# Patient Record
Sex: FEMALE | Race: WHITE | Employment: OTHER | URBAN - NONMETROPOLITAN AREA
[De-identification: names, ages, dates, MRNs, and addresses within clinical notes are randomized per-mention and may not be internally consistent; named-entity substitution may affect disease eponyms.]

---

## 2020-09-14 ENCOUNTER — HOSPITAL ENCOUNTER (OUTPATIENT)
Dept: MAMMOGRAPHY | Age: 65
Discharge: HOME | End: 2020-09-14
Attending: OBSTETRICS & GYNECOLOGY
Payer: MEDICARE

## 2020-09-14 DIAGNOSIS — Z12.31 VISIT FOR SCREENING MAMMOGRAM: ICD-10-CM

## 2020-09-14 PROCEDURE — 77067 SCR MAMMO BI INCL CAD: CPT

## 2021-08-27 ENCOUNTER — TRANSCRIBE ORDER (OUTPATIENT)
Dept: SCHEDULING | Age: 66
End: 2021-08-27

## 2021-08-27 DIAGNOSIS — Z12.31 VISIT FOR SCREENING MAMMOGRAM: Primary | ICD-10-CM

## 2021-09-15 ENCOUNTER — HOSPITAL ENCOUNTER (OUTPATIENT)
Dept: MAMMOGRAPHY | Age: 66
Discharge: HOME | End: 2021-09-15
Attending: OBSTETRICS & GYNECOLOGY
Payer: MEDICARE

## 2021-09-15 DIAGNOSIS — Z12.31 VISIT FOR SCREENING MAMMOGRAM: ICD-10-CM

## 2021-09-15 PROCEDURE — 77067 SCR MAMMO BI INCL CAD: CPT

## 2021-09-23 ENCOUNTER — TRANSCRIBE ORDER (OUTPATIENT)
Dept: SCHEDULING | Age: 66
End: 2021-09-23

## 2021-09-23 DIAGNOSIS — R00.2 PALPITATIONS: ICD-10-CM

## 2021-09-23 DIAGNOSIS — I34.1 MVP (MITRAL VALVE PROLAPSE): ICD-10-CM

## 2021-09-23 DIAGNOSIS — R00.2 PALPITATION: Primary | ICD-10-CM

## 2021-10-20 ENCOUNTER — HOSPITAL ENCOUNTER (OUTPATIENT)
Dept: NON INVASIVE DIAGNOSTICS | Age: 66
Discharge: HOME | End: 2021-10-20
Attending: INTERNAL MEDICINE
Payer: MEDICARE

## 2021-10-20 VITALS
HEIGHT: 65 IN | SYSTOLIC BLOOD PRESSURE: 138 MMHG | DIASTOLIC BLOOD PRESSURE: 62 MMHG | BODY MASS INDEX: 23.66 KG/M2 | WEIGHT: 142 LBS

## 2021-10-20 DIAGNOSIS — R00.2 PALPITATIONS: ICD-10-CM

## 2021-10-20 DIAGNOSIS — I34.1 MVP (MITRAL VALVE PROLAPSE): ICD-10-CM

## 2021-10-20 LAB
ECHO AO ASC DIAM: 3.2 CM
ECHO AO ROOT DIAM: 2.5 CM
ECHO AV AREA PEAK VELOCITY: 2.94 CM2
ECHO AV AREA PLAN/BSA: 1.63
ECHO AV AREA VTI: 2.79 CM2
ECHO AV AREA/BSA PEAK VELOCITY: 1.7 CM2/M2
ECHO AV AREA/BSA VTI: 1.6 CM2/M2
ECHO AV CUSP MM: 1.6 CM
ECHO AV MEAN GRADIENT: 3 MMHG
ECHO AV MEAN VELOCITY: 87.1 CM/S
ECHO AV PEAK GRADIENT: 6 MMHG
ECHO AV PEAK VELOCITY: 123 CM/S
ECHO AV VTI: 29.3 CM
ECHO EST RA PRESSURE: 3 MMHG
ECHO LA AREA 2C: 17.6 CM2
ECHO LA AREA 4C: 17.8 CM2
ECHO LA MAJOR AXIS: 3.2 CM
ECHO LA MAJOR AXIS: 5.02 CM
ECHO LA TO AORTIC ROOT RATIO: 1.28
ECHO LV E' LATERAL VELOCITY: 9.08 CM/S
ECHO LV E' SEPTAL VELOCITY: 5.87 CM/S
ECHO LV EDV A2C: 104 CM3
ECHO LV EJECTION FRACTION BIPLANE: 62.8 % (ref 55–100)
ECHO LV ESV A2C: 29.8 CM3
ECHO LV INTERNAL DIMENSION DIASTOLIC: 4.7 CM (ref 3.9–5.3)
ECHO LV INTERNAL DIMENSION SYSTOLIC: 3.1 CM
ECHO LV IVSD: 1 CM (ref 0.6–0.9)
ECHO LV MASS 2D: 164.5 G (ref 67–162)
ECHO LV MASS INDEX 2D: 96.2 G/M2 (ref 43–95)
ECHO LV POSTERIOR WALL DIASTOLIC: 1 CM (ref 0.6–0.9)
ECHO LVOT DIAM: 2 CM
ECHO LVOT PEAK GRADIENT: 5 MMHG
ECHO LVOT PEAK VELOCITY: 115 CM/S
ECHO LVOT SV: 82 CM3
ECHO LVOT VTI: 26 CM
ECHO LVOT VTI: 26 CM
ECHO MV A VELOCITY: 81.5 CM/S
ECHO MV AREA PHT: 3.33 CM2
ECHO MV E DECELERATION TIME (DT): 225 MS
ECHO MV E VELOCITY: 74.2 CM/S
ECHO MV E/A RATIO: 0.91
ECHO MV E/E' LATERAL: 8.17
ECHO MV E/E' RATIO (AVERAGED): 10.41
ECHO MV E/E' SEPTAL: 12.64
ECHO MV MAX VELOCITY: 79.6 CM/S
ECHO MV MEAN GRADIENT: 1 MMHG
ECHO MV PEAK GRADIENT: 3 MMHG
ECHO MV PRESSURE HALF TIME (PHT): 66 MS
ECHO MV VTI: 28.5 CM
ECHO RA AREA 4C: 12.7 CM2
ECHO RA MAJOR AXIS: 4.28 CM
ECHO RA MINOR AXIS: 3.4 CM
ECHO RIGHT VENTRICULAR SYSTOLIC PRESSURE (RVSP): 26 MMHG
ECHO RV INTERNAL DIMENSION: 2.4 CM
ECHO RV TAPSE: 1.75 CM (ref 1.5–2)
ECHO TV MEAN GRADIENT: 23 MMHG
ECHO TV REGURGITANT MAX VELOCITY: 239 CM/S
LA VOL DISK BP: 51.6 CM3 (ref 22–52)
LVOT MG: 3 MMHG
MV DEC SLOPE: 3290 MM/S2
MV DEC SLOPE: 3290 MM/S2

## 2021-10-20 PROCEDURE — 93226 XTRNL ECG REC<48 HR SCAN A/R: CPT

## 2021-10-20 PROCEDURE — 93306 TTE W/DOPPLER COMPLETE: CPT

## 2022-09-01 ENCOUNTER — TRANSCRIBE ORDER (OUTPATIENT)
Dept: SCHEDULING | Age: 67
End: 2022-09-01

## 2022-09-01 DIAGNOSIS — Z12.31 VISIT FOR SCREENING MAMMOGRAM: Primary | ICD-10-CM

## 2022-09-19 ENCOUNTER — HOSPITAL ENCOUNTER (OUTPATIENT)
Dept: MAMMOGRAPHY | Age: 67
Discharge: HOME | End: 2022-09-19
Attending: INTERNAL MEDICINE
Payer: MEDICARE

## 2022-09-19 DIAGNOSIS — Z12.31 VISIT FOR SCREENING MAMMOGRAM: ICD-10-CM

## 2022-09-19 PROCEDURE — 77063 BREAST TOMOSYNTHESIS BI: CPT

## 2022-11-10 ENCOUNTER — TRANSCRIBE ORDER (OUTPATIENT)
Dept: SCHEDULING | Age: 67
End: 2022-11-10

## 2022-11-10 DIAGNOSIS — M81.0 POST-MENOPAUSAL OSTEOPOROSIS: Primary | ICD-10-CM

## 2022-11-17 ENCOUNTER — PREP FOR PROCEDURE (OUTPATIENT)
Dept: GASTROENTEROLOGY | Age: 67
End: 2022-11-17

## 2022-11-17 ENCOUNTER — OFFICE VISIT (OUTPATIENT)
Dept: GASTROENTEROLOGY | Age: 67
End: 2022-11-17
Payer: MEDICARE

## 2022-11-17 ENCOUNTER — TRANSCRIBE ORDER (OUTPATIENT)
Dept: SCHEDULING | Age: 67
End: 2022-11-17

## 2022-11-17 VITALS
DIASTOLIC BLOOD PRESSURE: 57 MMHG | SYSTOLIC BLOOD PRESSURE: 123 MMHG | WEIGHT: 145 LBS | HEART RATE: 67 BPM | BODY MASS INDEX: 24.13 KG/M2

## 2022-11-17 DIAGNOSIS — R09.89 CAROTID BRUIT: ICD-10-CM

## 2022-11-17 DIAGNOSIS — E78.2 MIXED HYPERLIPIDEMIA: ICD-10-CM

## 2022-11-17 DIAGNOSIS — K21.9 GASTROESOPHAGEAL REFLUX DISEASE WITHOUT ESOPHAGITIS: ICD-10-CM

## 2022-11-17 DIAGNOSIS — R19.7 DIARRHEA, UNSPECIFIED TYPE: Primary | ICD-10-CM

## 2022-11-17 DIAGNOSIS — E03.9 ACQUIRED HYPOTHYROIDISM: ICD-10-CM

## 2022-11-17 DIAGNOSIS — Z12.11 COLON CANCER SCREENING: ICD-10-CM

## 2022-11-17 DIAGNOSIS — I34.1 MITRAL VALVE PROLAPSE: Primary | ICD-10-CM

## 2022-11-17 PROCEDURE — 1090F PRES/ABSN URINE INCON ASSESS: CPT | Performed by: INTERNAL MEDICINE

## 2022-11-17 PROCEDURE — 3017F COLORECTAL CA SCREEN DOC REV: CPT | Performed by: INTERNAL MEDICINE

## 2022-11-17 PROCEDURE — 99203 OFFICE O/P NEW LOW 30 MIN: CPT | Performed by: INTERNAL MEDICINE

## 2022-11-17 PROCEDURE — 1123F ACP DISCUSS/DSCN MKR DOCD: CPT | Performed by: INTERNAL MEDICINE

## 2022-11-17 PROCEDURE — G8400 PT W/DXA NO RESULTS DOC: HCPCS | Performed by: INTERNAL MEDICINE

## 2022-11-17 PROCEDURE — G8536 NO DOC ELDER MAL SCRN: HCPCS | Performed by: INTERNAL MEDICINE

## 2022-11-17 PROCEDURE — G9899 SCRN MAM PERF RSLTS DOC: HCPCS | Performed by: INTERNAL MEDICINE

## 2022-11-17 PROCEDURE — G8510 SCR DEP NEG, NO PLAN REQD: HCPCS | Performed by: INTERNAL MEDICINE

## 2022-11-17 PROCEDURE — 1101F PT FALLS ASSESS-DOCD LE1/YR: CPT | Performed by: INTERNAL MEDICINE

## 2022-11-17 PROCEDURE — G8427 DOCREV CUR MEDS BY ELIG CLIN: HCPCS | Performed by: INTERNAL MEDICINE

## 2022-11-17 PROCEDURE — G8420 CALC BMI NORM PARAMETERS: HCPCS | Performed by: INTERNAL MEDICINE

## 2022-11-17 RX ORDER — OMEPRAZOLE 40 MG/1
CAPSULE, DELAYED RELEASE ORAL
COMMUNITY
Start: 2022-10-06

## 2022-11-17 RX ORDER — LEVOTHYROXINE SODIUM 50 UG/1
TABLET ORAL
COMMUNITY
Start: 2022-11-03

## 2022-11-17 RX ORDER — ALENDRONATE SODIUM 70 MG/1
TABLET ORAL
COMMUNITY
Start: 2022-08-15

## 2022-11-17 RX ORDER — SODIUM CHLORIDE, SODIUM LACTATE, POTASSIUM CHLORIDE, CALCIUM CHLORIDE 600; 310; 30; 20 MG/100ML; MG/100ML; MG/100ML; MG/100ML
75 INJECTION, SOLUTION INTRAVENOUS CONTINUOUS
OUTPATIENT
Start: 2022-11-17 | End: 2022-11-17

## 2022-11-17 RX ORDER — POLYETHYLENE GLYCOL 3350, SODIUM SULFATE ANHYDROUS, SODIUM BICARBONATE, SODIUM CHLORIDE, POTASSIUM CHLORIDE 236; 22.74; 6.74; 5.86; 2.97 G/4L; G/4L; G/4L; G/4L; G/4L
4 POWDER, FOR SOLUTION ORAL
Qty: 4000 ML | Refills: 0 | Status: SHIPPED | OUTPATIENT
Start: 2022-11-17 | End: 2022-11-17

## 2022-11-17 RX ORDER — SIMVASTATIN 20 MG/1
TABLET, FILM COATED ORAL
COMMUNITY
Start: 2022-10-05

## 2022-11-17 RX ORDER — ERGOCALCIFEROL 1.25 MG/1
CAPSULE ORAL
COMMUNITY
Start: 2022-10-03

## 2022-11-17 NOTE — PROGRESS NOTES
Referring Physician:  Navjot Johnson MD     Chief Complaint: Diarrhea, colon cancer screening    Date of service: 11/17/22     Subjective:     History of Present Illness:  Patient is a female [address] [facility] 79 y.o.  who is seen for evaluation for diarrhea. The history is from the patient and their medical records. The patient has GI complaints of diarrhea for 2 years. Patient describes their bowel movements as follows: watery, with 1-2 BM a day. The watery BM are about once a week. Associated symptoms include none. The patient has tried the following to treat their diarrhea: fiber pills- only took for a week then stopped for unclear reasons. Has never used imodium AD. The patient denies nausea, vomiting, fever, chills, abdominal pain, dysphagia, change in bowel habits, constipation, weight loss, rectal bleeding, or melena. She has occasional reflux that responds to daily Omeprazole. Her reflux syptoms are mainly burping, and not regurgitation or heartburn. Admits to drinking lots of soda daily. Last EGD- none. Last colonoscopy-  last one 10 years ago- no polyps. Family history for GI disease is significant for no GI or liver disease. The patient denies liver related risk factor. Tobacco use- none. Alcohol use- none. Polysubstance use -none. Past medical history is significant for  hypothyroidism, GERD, hyperlipidemia. PMH:  Past Medical History:   Diagnosis Date    Hypercholesterolemia     Menopause         PSH:  History reviewed. No pertinent surgical history. Allergies:  No Known Allergies     Home Medications:  Cannot display prior to admission medications because the patient has not been admitted in this contact.         Hospital Medications:  Current Outpatient Medications   Medication Sig    alendronate (FOSAMAX) 70 mg tablet TAKE 1 TABLET BY MOUTH ONCE A WEEK IN THE MORNING AT LEAST 30 MINUTES BEFORE FIRST FOOD, BEVERAGE OR MEDICATION OF THE DAY    simvastatin (ZOCOR) 20 mg tablet TAKE 1 TABLET BY MOUTH ONCE DAILY FOR CHOLESTEROL    omeprazole (PRILOSEC) 40 mg capsule TAKE 1 CAPSULE BY MOUTH ONCE DAILY IN THE MORNING 15 MINUTES PRIOR TO BREAKFAST    levothyroxine (SYNTHROID) 50 mcg tablet TAKE 1 TABLET BY MOUTH ONCE DAILY FOR THYROID    ergocalciferol (ERGOCALCIFEROL) 1,250 mcg (50,000 unit) capsule TAKE 1 CAPSULE BY MOUTH ONCE EVERY MONTH     No current facility-administered medications for this visit. Social History:  Social History     Tobacco Use    Smoking status: Never    Smokeless tobacco: Never   Substance Use Topics    Alcohol use: Not on file        Pt denies any history of IV drug use, blood transfusions. Family History:  History reviewed. No pertinent family history. Review of Systems:  A detailed 10 system ROS is obtained, with pertinent positives as listed above. All others are negative unless listed in history above. Constitutional denies fever chills, headache, or weight loss. Skin- denies lesions or rashes. HEENT- denies any vision or hearing problems, epistaxis, sore throat, or dental problems. Lungs- no shortness of breath or chest pain reported, no dyspnea on exertion. Cardiac- no palpitations or chest pain reported, including at rest or on exertion. GI-no abdominal pain, melena, rectal bleeding, reflux, dysphagia, jaundice, change in stool or urine color, constipation or diarrhea. Genitourinary -no dysuria or hematuria. Musculoskeletal-no muscle weakness or disuse or atrophy. Neurologic-no numbness, tingling, gait disturbance, or other abnormalities. Rheumatologic- patient denies any immune or rheumatologic diseases or symptoms. Endocrine- patient denies any endocrine abnormalities including thyroid disease or diabetes. Psychologic-patient denies depression, anxiety or emotional issues. No reported memory issues.         Objective:     Physical Exam:  Vitals: BP (!) 123/57 (BP 1 Location: Left upper arm, BP Patient Position: Sitting)   Pulse 67   Wt 65.8 kg (145 lb)   BMI 24.13 kg/m²    Gen:  Pt is alert, cooperative, no acute distress  Skin:  Extremities and face reveal no rashes. No fountain erythema. No telangiectasias on the chest wall. HEENT: Sclerae anicteric. Extra-ocular muscles are intact. No oral ulcers. No abnormal pigmentation of the lips. The neck is supple. Cardiovascular: Regular rate and rhythm. No murmurs, gallops, or rubs. Respiratory:  Comfortable breathing with no accessory muscle use. Clear breath sounds anteriorly with no wheezes, rales, or rhonchi. GI:  Abdomen nondistended, soft, and nontender. Normal active bowel sounds. No enlargement of the liver or spleen. No masses palpable. Rectal:  Deferred  Musculoskeletal:   No costovertebral tenderness. No localized muscle weakness, or decreased range of motion. Extremities:  No palpable cords or pitting edema of the lower legs. Extremities have good range of motion. Neurological:  Gross memory appears intact. Patient is alert and oriented. Psychiatric:  Mood appears appropriate with judgement intact. Lymphatic:  No cervical or supraclavicular adenopathy. Laboratory:   No results        CT scan of abdomen and pelvis - No results  CT Results (most recent):  No results found for this or any previous visit. Abdominal US- No results  US Results (most recent):  No results found for this or any previous visit. MRI and MRCP- No results  MRI Results (most recent):  No results found for this or any previous visit. Assessment:     Diarrhea. This is a minimal problem, occurring 1x a week. Watery stool mainly, and I feel she would benefit from a fiber supplement. Esophageal reflux. Not sure if she has reflux or aerophagia with burping being the main complaint and only mild improvement with omeprazole. She drinks a lot of soda and the carbonation may be causing her burping. Colon cancer screening.   She is due for screening. Hypothyroidism. She is on oral thyroid replacement. Plan:     Colonoscopy with MAC. Bowel prep- PEG prep. I discussed the techniques involved with the procedure as well as the risks, benefits, and alternatives including but not limited to bleeding, infection, perforation requiring emergent surgery, missing lesions, death, and anesthesia related complications with the patient. All questions and concerns were answered. The patient voiced understanding and agrees to proceed. I recommend the patient start a high-fiber supplement such as Benefiber or generic equivalent. They are to take 1 tablespoon 1-2 times a day with adequate fluid intake. Also can be placed on food they are eating. They can also consider instead of Benefiber some other type of fiber if they choose, including Metamucil, Citrucel, etc.  OK to use the omeprazole on an as needed basis as I suspect her burping is due to aerophagia and not reflux. Further recommendations pending the patient's clinical course, if needed. The patient will follow up with me as needed.       Alex Hanna MD

## 2022-11-17 NOTE — LETTER
11/17/2022    Patient: Sharif Lyons   YOB: 1955   Date of Visit: 11/17/2022     Dayna De Luna MD  [facility] [address] [address]  Via Fax: [phone]     Sharan Haynes MD  [address] [facility] [place] [address]  Via Fax: [phone]    Dear MD Sharan Girard MD,      Thank you for referring Ms. Juanita Mercer to [address] [place] [address] for evaluation. My notes for this consultation are attached. If you have questions, please do not hesitate to call me. I look forward to following your patient along with you.       Sincerely,    Juan Brennan MD

## 2022-11-17 NOTE — PROGRESS NOTES
Nandini Jenkins presents today for   Chief Complaint   Patient presents with    New Patient     Screening colonoscopy        Is someone accompanying this pt? no    Is the patient using any DME equipment during OV? no    Depression Screening:  3 most recent PHQ Screens 11/17/2022   Little interest or pleasure in doing things Not at all   Feeling down, depressed, irritable, or hopeless Not at all   Total Score PHQ 2 0       Learning Assessment:  No flowsheet data found. Fall Risk  Fall Risk Assessment, last 12 mths 11/17/2022   Able to walk? Yes   Fall in past 12 months? 0   Do you feel unsteady? 0   Are you worried about falling 0       Coordination of Care:  1. Have you been to the ER, urgent care clinic since your last visit? Hospitalized since your last visit? no    2. Have you seen or consulted any other health care providers outside of the [address] [place] [address] since your last visit? Include any pap smears or colon screening.  Yes, pcp yusuf

## 2022-11-23 ENCOUNTER — TELEPHONE (OUTPATIENT)
Dept: GASTROENTEROLOGY | Age: 67
End: 2022-11-23

## 2022-11-23 NOTE — TELEPHONE ENCOUNTER
Spoke with pharmacist from [address] regarding patient's colonoscopy prep. Patient's insurance doesn't cover GO-LYTELY and the gavilyte is on back order. The only th ing they have in stock is suprep and sutab but unsure if insurance will cover that. What prep would you like to use for patient ?

## 2022-12-05 ENCOUNTER — TRANSCRIBE ORDER (OUTPATIENT)
Dept: SCHEDULING | Age: 67
End: 2022-12-05

## 2022-12-05 DIAGNOSIS — M81.0 AGE-RELATED OSTEOPOROSIS WITHOUT CURRENT PATHOLOGICAL FRACTURE: Primary | ICD-10-CM

## 2022-12-13 ENCOUNTER — ANESTHESIA EVENT (OUTPATIENT)
Dept: ENDOSCOPY | Age: 67
End: 2022-12-13
Payer: MEDICARE

## 2022-12-13 RX ORDER — MAGNESIUM SULFATE 100 %
4 CRYSTALS MISCELLANEOUS AS NEEDED
Status: CANCELLED | OUTPATIENT
Start: 2022-12-13

## 2022-12-13 RX ORDER — DEXTROSE 50 % IN WATER (D50W) INTRAVENOUS SYRINGE
25-50 AS NEEDED
Status: CANCELLED | OUTPATIENT
Start: 2022-12-13

## 2022-12-15 ENCOUNTER — PREP FOR PROCEDURE (OUTPATIENT)
Dept: GASTROENTEROLOGY | Age: 67
End: 2022-12-15

## 2022-12-15 RX ORDER — SODIUM CHLORIDE, SODIUM LACTATE, POTASSIUM CHLORIDE, CALCIUM CHLORIDE 600; 310; 30; 20 MG/100ML; MG/100ML; MG/100ML; MG/100ML
75 INJECTION, SOLUTION INTRAVENOUS CONTINUOUS
OUTPATIENT
Start: 2022-12-15 | End: 2022-12-15

## 2022-12-15 NOTE — H&P
Date of Surgery Update:  Reed Galicia was seen and examined. History and physical has been reviewed. The patient has been examined.  There have been no significant clinical changes since the completion of the originally dated History and Physical.    Signed By: Sanjuana Diana MD     December 15, 2022 3:59 PM

## 2022-12-21 ENCOUNTER — ANESTHESIA (OUTPATIENT)
Dept: ENDOSCOPY | Age: 67
End: 2022-12-21
Payer: MEDICARE

## 2022-12-21 ENCOUNTER — HOSPITAL ENCOUNTER (OUTPATIENT)
Age: 67
Setting detail: OUTPATIENT SURGERY
Discharge: HOME | End: 2022-12-21
Attending: INTERNAL MEDICINE | Admitting: INTERNAL MEDICINE
Payer: MEDICARE

## 2022-12-21 VITALS
RESPIRATION RATE: 16 BRPM | HEART RATE: 68 BPM | HEIGHT: 65 IN | OXYGEN SATURATION: 100 % | WEIGHT: 142 LBS | DIASTOLIC BLOOD PRESSURE: 65 MMHG | TEMPERATURE: 97 F | SYSTOLIC BLOOD PRESSURE: 142 MMHG | BODY MASS INDEX: 23.66 KG/M2

## 2022-12-21 PROCEDURE — G0121 COLON CA SCRN NOT HI RSK IND: HCPCS | Performed by: INTERNAL MEDICINE

## 2022-12-21 PROCEDURE — 2709999900 HC NON-CHARGEABLE SUPPLY: Performed by: INTERNAL MEDICINE

## 2022-12-21 PROCEDURE — 76040000019: Performed by: INTERNAL MEDICINE

## 2022-12-21 PROCEDURE — 77030037186 HC VLV ENDOSC STRL DEFENDO DISP MVAT -A: Performed by: INTERNAL MEDICINE

## 2022-12-21 PROCEDURE — 77030042138 HC TBNG SPECIAL -A: Performed by: INTERNAL MEDICINE

## 2022-12-21 PROCEDURE — 74011250636 HC RX REV CODE- 250/636: Performed by: NURSE ANESTHETIST, CERTIFIED REGISTERED

## 2022-12-21 PROCEDURE — 76060000031 HC ANESTHESIA FIRST 0.5 HR: Performed by: INTERNAL MEDICINE

## 2022-12-21 PROCEDURE — 74011000250 HC RX REV CODE- 250: Performed by: NURSE ANESTHETIST, CERTIFIED REGISTERED

## 2022-12-21 PROCEDURE — 77030018831 HC SOL IRR H20 BAXT -A: Performed by: INTERNAL MEDICINE

## 2022-12-21 RX ORDER — GLYCOPYRROLATE 0.2 MG/ML
INJECTION INTRAMUSCULAR; INTRAVENOUS AS NEEDED
Status: DISCONTINUED | OUTPATIENT
Start: 2022-12-21 | End: 2022-12-21 | Stop reason: HOSPADM

## 2022-12-21 RX ORDER — LIDOCAINE HYDROCHLORIDE 20 MG/ML
INJECTION, SOLUTION EPIDURAL; INFILTRATION; INTRACAUDAL; PERINEURAL AS NEEDED
Status: DISCONTINUED | OUTPATIENT
Start: 2022-12-21 | End: 2022-12-21 | Stop reason: HOSPADM

## 2022-12-21 RX ORDER — PROPOFOL 10 MG/ML
INJECTION, EMULSION INTRAVENOUS AS NEEDED
Status: DISCONTINUED | OUTPATIENT
Start: 2022-12-21 | End: 2022-12-21 | Stop reason: HOSPADM

## 2022-12-21 RX ORDER — SODIUM CHLORIDE 0.9 % (FLUSH) 0.9 %
5-40 SYRINGE (ML) INJECTION AS NEEDED
Status: DISCONTINUED | OUTPATIENT
Start: 2022-12-21 | End: 2022-12-21 | Stop reason: HOSPADM

## 2022-12-21 RX ORDER — SODIUM CHLORIDE, SODIUM LACTATE, POTASSIUM CHLORIDE, CALCIUM CHLORIDE 600; 310; 30; 20 MG/100ML; MG/100ML; MG/100ML; MG/100ML
25 INJECTION, SOLUTION INTRAVENOUS CONTINUOUS
Status: DISCONTINUED | OUTPATIENT
Start: 2022-12-21 | End: 2022-12-21 | Stop reason: HOSPADM

## 2022-12-21 RX ADMIN — LIDOCAINE HYDROCHLORIDE 100 MG: 20 INJECTION, SOLUTION EPIDURAL; INFILTRATION; INTRACAUDAL; PERINEURAL at 12:14

## 2022-12-21 RX ADMIN — PROPOFOL 30 MG: 10 INJECTION, EMULSION INTRAVENOUS at 12:19

## 2022-12-21 RX ADMIN — SODIUM CHLORIDE, POTASSIUM CHLORIDE, SODIUM LACTATE AND CALCIUM CHLORIDE 25 ML/HR: 600; 310; 30; 20 INJECTION, SOLUTION INTRAVENOUS at 10:41

## 2022-12-21 RX ADMIN — PROPOFOL 20 MG: 10 INJECTION, EMULSION INTRAVENOUS at 12:22

## 2022-12-21 RX ADMIN — PROPOFOL 50 MG: 10 INJECTION, EMULSION INTRAVENOUS at 12:16

## 2022-12-21 RX ADMIN — PROPOFOL 20 MG: 10 INJECTION, EMULSION INTRAVENOUS at 12:26

## 2022-12-21 RX ADMIN — PROPOFOL 80 MG: 10 INJECTION, EMULSION INTRAVENOUS at 12:14

## 2022-12-21 RX ADMIN — GLYCOPYRROLATE 0.2 MG: 0.2 INJECTION INTRAMUSCULAR; INTRAVENOUS at 12:23

## 2022-12-21 NOTE — INTERVAL H&P NOTE
Update History & Physical    The Patient's History and Physical of December 21st, 2022. The procedure was reviewed with the patient and I examined the patient. There was no change. The surgical site was confirmed by the patient and me. Plan:  The risk, benefits, expected outcome, and alternative to the recommended procedure have been discussed with the patient. Patient understands and wants to proceed with the procedure.     Electronically signed by Meghana Hager MD on 12/21/2022 at 10:25 AM

## 2022-12-21 NOTE — PROCEDURES
Colonoscopy procedure note    Date of service: 12/21/22     Type: Screening    Indication for procedure: Colon cancer screening    Anesthesia classification: ASA class 2    Patient history and physical been accomplished and documented. Patient is assessed and determined to be appropriate candidate for planned procedure and sedation; patient reassessed immediately prior to sedation. Sedation plan: MAC per anesthesia    Surgical assistant: Not applicable    Airway assessment: Range of motion: Normal, mouth opening, Visual obstruction: No.    UPDATED PREOP EXAM:  Unchanged. VS: Reviewed  Gen: in NAD  CV: RRR, no murmur  Resp: CTA  Abd: Soft, NTND, +BS  Extrem: No cyanosis or edema  Neuro: Awake and alert    Informed consent obtained: Yes. The indications, risks including but not limited to bleeding, perforation, infection, death, and potential failure to visual areas are diagnosed neoplasia, alternatives and benefits were discussed with the patient prior to the procedure. Patient identity and procedure was verified, absent was obtained, and is consistent with the consent form found in the patient's records. PROCEDURE PERFORMED:  COLONOSCOPY  to the cecum with MAC  and    INSTRUMENT: Olympus colonoscope per nursing notes. FINDINGS:    External anal lesions: Normal   Rectum: normal.   Retroflexion view: Normal  Sigmoid: normal except for severe diverticulosis. Descending Colon: normal diverticulosis. Transverse Colon: normal   Ascending Colon: normal   Cecum: normal, including the appendiceal orifice and ileocecal valve. Terminal ileum: not evaluated     Specimens: none     Bowel preparation- adequate to detect small (5mm) polyps or larger. Estimated blood loss: none   Complications:  none   Cecal withdrawal time: 8 minutes. Comments:  none     Impression:  Left-sided diverticulosis. Otherwise normal colonoscopy to the cecum. No polyps or masses were seen.       Recommendations:  Repeat colonoscopy in 10 years for screening. Follow up as needed.

## 2022-12-21 NOTE — ANESTHESIA POSTPROCEDURE EVALUATION
Procedure(s):  COLONOSCOPY. MAC    Anesthesia Post Evaluation      Multimodal analgesia: multimodal analgesia used between 6 hours prior to anesthesia start to PACU discharge  Patient location during evaluation: PACU  Patient participation: complete - patient participated  Level of consciousness: awake and alert  Pain management: adequate  Airway patency: patent  Anesthetic complications: no  Cardiovascular status: acceptable  Respiratory status: acceptable  Hydration status: acceptable  Comments: Ok to discharge when standard criteria are met. Post anesthesia nausea and vomiting:  none  Final Post Anesthesia Temperature Assessment:  Normothermia (36.0-37.5 degrees C)      INITIAL Post-op Vital signs: No vitals data found for the desired time range.

## 2022-12-21 NOTE — ANESTHESIA PREPROCEDURE EVALUATION
Relevant Problems   No relevant active problems       Anesthetic History   No history of anesthetic complications            Review of Systems / Medical History  Patient summary reviewed, nursing notes reviewed and pertinent labs reviewed    Pulmonary  Within defined limits                 Neuro/Psych   Within defined limits           Cardiovascular  Within defined limits                Exercise tolerance: >4 METS     GI/Hepatic/Renal  Within defined limits              Endo/Other  Within defined limits           Other Findings              Physical Exam    Airway  Mallampati: II  TM Distance: 4 - 6 cm  Neck ROM: normal range of motion   Mouth opening: Normal     Cardiovascular  Regular rate and rhythm,  S1 and S2 normal,  no murmur, click, rub, or gallop  Rhythm: regular  Rate: normal         Dental  No notable dental hx       Pulmonary  Breath sounds clear to auscultation               Abdominal  GI exam deferred       Other Findings            Anesthetic Plan    ASA: 1  Anesthesia type: MAC          Induction: Intravenous

## 2023-01-05 ENCOUNTER — HOSPITAL ENCOUNTER (OUTPATIENT)
Dept: MAMMOGRAPHY | Age: 68
Discharge: HOME | End: 2023-01-05
Attending: OBSTETRICS & GYNECOLOGY
Payer: MEDICARE

## 2023-01-05 DIAGNOSIS — M81.0 AGE-RELATED OSTEOPOROSIS WITHOUT CURRENT PATHOLOGICAL FRACTURE: ICD-10-CM

## 2023-01-05 PROCEDURE — 77080 DXA BONE DENSITY AXIAL: CPT

## 2023-01-20 ENCOUNTER — HOSPITAL ENCOUNTER (OUTPATIENT)
Dept: VASCULAR SURGERY | Age: 68
Discharge: HOME | End: 2023-01-20
Attending: INTERNAL MEDICINE
Payer: MEDICARE

## 2023-01-20 ENCOUNTER — HOSPITAL ENCOUNTER (OUTPATIENT)
Dept: NON INVASIVE DIAGNOSTICS | Age: 68
End: 2023-01-20
Attending: INTERNAL MEDICINE
Payer: MEDICARE

## 2023-01-20 VITALS
BODY MASS INDEX: 23.66 KG/M2 | WEIGHT: 142 LBS | HEIGHT: 65 IN | DIASTOLIC BLOOD PRESSURE: 65 MMHG | SYSTOLIC BLOOD PRESSURE: 142 MMHG

## 2023-01-20 DIAGNOSIS — R09.89 CAROTID BRUIT: ICD-10-CM

## 2023-01-20 DIAGNOSIS — I34.1 MITRAL VALVE PROLAPSE: ICD-10-CM

## 2023-01-20 LAB
ECHO AO ROOT DIAM: 2.7 CM
ECHO AO ROOT INDEX: 1.58 CM/M2
ECHO AR MAX VEL PISA: 3.1 M/S
ECHO AV AREA PEAK VELOCITY: 2.1 CM2
ECHO AV AREA VTI: 2.2 CM2
ECHO AV AREA/BSA PEAK VELOCITY: 1.2 CM2/M2
ECHO AV AREA/BSA VTI: 1.3 CM2/M2
ECHO AV MEAN GRADIENT: 3 MMHG
ECHO AV MEAN VELOCITY: 0.9 M/S
ECHO AV PEAK GRADIENT: 6 MMHG
ECHO AV PEAK VELOCITY: 1.2 M/S
ECHO AV REGURGITANT PHT: 1673.6 MILLISECOND
ECHO AV VELOCITY RATIO: 0.67
ECHO AV VTI: 27.2 CM
ECHO LA DIAMETER INDEX: 2.28 CM/M2
ECHO LA DIAMETER: 3.9 CM
ECHO LA TO AORTIC ROOT RATIO: 1.44
ECHO LA VOL 2C: 49 ML (ref 22–52)
ECHO LA VOL 4C: 42 ML (ref 22–52)
ECHO LA VOL BP: 46 ML (ref 22–52)
ECHO LA VOL/BSA BIPLANE: 27 ML/M2 (ref 16–34)
ECHO LA VOLUME AREA LENGTH: 50 ML
ECHO LA VOLUME INDEX A2C: 29 ML/M2 (ref 16–34)
ECHO LA VOLUME INDEX A4C: 25 ML/M2 (ref 16–34)
ECHO LA VOLUME INDEX AREA LENGTH: 29 ML/M2 (ref 16–34)
ECHO LV E' LATERAL VELOCITY: 8 CM/S
ECHO LV E' SEPTAL VELOCITY: 7 CM/S
ECHO LV EDV A2C: 67 ML
ECHO LV EDV A4C: 88 ML
ECHO LV EDV BP: 84 ML (ref 56–104)
ECHO LV EDV INDEX A4C: 51 ML/M2
ECHO LV EDV INDEX BP: 49 ML/M2
ECHO LV EDV NDEX A2C: 39 ML/M2
ECHO LV EJECTION FRACTION A2C: 63 %
ECHO LV EJECTION FRACTION A4C: 64 %
ECHO LV EJECTION FRACTION BIPLANE: 66 % (ref 55–100)
ECHO LV ESV A2C: 25 ML
ECHO LV ESV A4C: 31 ML
ECHO LV ESV BP: 28 ML (ref 19–49)
ECHO LV ESV INDEX A2C: 15 ML/M2
ECHO LV ESV INDEX A4C: 18 ML/M2
ECHO LV ESV INDEX BP: 16 ML/M2
ECHO LV FRACTIONAL SHORTENING: 32 % (ref 28–44)
ECHO LV INTERNAL DIMENSION DIASTOLE INDEX: 2.75 CM/M2
ECHO LV INTERNAL DIMENSION DIASTOLIC: 4.7 CM (ref 3.9–5.3)
ECHO LV INTERNAL DIMENSION SYSTOLIC INDEX: 1.87 CM/M2
ECHO LV INTERNAL DIMENSION SYSTOLIC: 3.2 CM
ECHO LV IVSD: 1 CM (ref 0.6–0.9)
ECHO LV MASS 2D: 164.5 G (ref 67–162)
ECHO LV MASS INDEX 2D: 96.2 G/M2 (ref 43–95)
ECHO LV POSTERIOR WALL DIASTOLIC: 1 CM (ref 0.6–0.9)
ECHO LV RELATIVE WALL THICKNESS RATIO: 0.43
ECHO LVOT AREA: 3.1 CM2
ECHO LVOT AV VTI INDEX: 0.72
ECHO LVOT DIAM: 2 CM
ECHO LVOT MEAN GRADIENT: 2 MMHG
ECHO LVOT PEAK GRADIENT: 3 MMHG
ECHO LVOT PEAK VELOCITY: 0.8 M/S
ECHO LVOT STROKE VOLUME INDEX: 36 ML/M2
ECHO LVOT SV: 61.5 ML
ECHO LVOT VTI: 19.6 CM
ECHO MAIN PULMONARY ARTERY DIAMETER: 1.9 CM
ECHO MV A VELOCITY: 0.82 M/S
ECHO MV AREA VTI: 2.2 CM2
ECHO MV E DECELERATION TIME (DT): 211.9 MS
ECHO MV E VELOCITY: 0.58 M/S
ECHO MV E/A RATIO: 0.71
ECHO MV E/E' LATERAL: 7.25
ECHO MV E/E' RATIO (AVERAGED): 7.77
ECHO MV E/E' SEPTAL: 8.29
ECHO MV LVOT VTI INDEX: 1.41
ECHO MV MAX VELOCITY: 0.8 M/S
ECHO MV MEAN GRADIENT: 1 MMHG
ECHO MV MEAN VELOCITY: 0.4 M/S
ECHO MV PEAK GRADIENT: 3 MMHG
ECHO MV REGURGITANT ALIASING (NYQUIST) VELOCITY: 30 CM/S
ECHO MV REGURGITANT VELOCITY PISA: 5.4 M/S
ECHO MV REGURGITANT VTIA: 166.8 CM
ECHO MV VTI: 27.7 CM
ECHO PULMONARY ARTERY END DIASTOLIC PRESSURE: 7 MMHG
ECHO PV MAX VELOCITY: 0.8 M/S
ECHO PV MEAN GRADIENT: 1 MMHG
ECHO PV MEAN VELOCITY: 0.6 M/S
ECHO PV PEAK GRADIENT: 3 MMHG
ECHO PV REGURGITANT MAX VELOCITY: 1.3 M/S
ECHO RV INTERNAL DIMENSION: 2.7 CM
ECHO RV TAPSE: 2 CM (ref 1.7–?)
LEFT CCA DIST DIAS: 16.3 CM/S
LEFT CCA DIST SYS: 62.9 CM/S
LEFT CCA MID DIAS: 17.85 CM/S
LEFT CCA MID SYS: 69.95 CM/S
LEFT CCA PROX DIAS: 17.2 CM/S
LEFT CCA PROX SYS: 83.5 CM/S
LEFT ECA DIAS: 11.2 CM/S
LEFT ECA SYS: 101.8 CM/S
LEFT ICA DIST DIAS: 24.9 CM/S
LEFT ICA DIST SYS: 79.2 CM/S
LEFT ICA MID DIAS: 24.3 CM/S
LEFT ICA MID SYS: 71.1 CM/S
LEFT ICA PROX DIAS: 13.8 CM/S
LEFT ICA PROX SYS: 55.4 CM/S
LEFT ICA/CCA SYS: 1.26 NO UNITS
LEFT VERTEBRAL DIAS: 11.69 CM/S
LEFT VERTEBRAL SYS: 49 CM/S
RIGHT CCA DIST DIAS: 12.2 CM/S
RIGHT CCA DIST SYS: 48.6 CM/S
RIGHT CCA MID DIAS: 12.3 CM/S
RIGHT CCA MID SYS: 52.38 CM/S
RIGHT CCA PROX DIAS: 15.7 CM/S
RIGHT CCA PROX SYS: 65.3 CM/S
RIGHT ECA DIAS: 8.33 CM/S
RIGHT ECA SYS: 78.7 CM/S
RIGHT ICA DIST DIAS: 21.2 CM/S
RIGHT ICA DIST SYS: 68.3 CM/S
RIGHT ICA MID DIAS: 19.7 CM/S
RIGHT ICA MID SYS: 72.8 CM/S
RIGHT ICA PROX DIAS: 12.6 CM/S
RIGHT ICA PROX SYS: 52.4 CM/S
RIGHT ICA/CCA SYS: 1.5 NO UNITS
RIGHT VERTEBRAL DIAS: 12.97 CM/S
RIGHT VERTEBRAL SYS: 46.7 CM/S
VAS LEFT SUBCLAVIAN PROX PSV: 75.1 CM/S
VAS RIGHT SUBCLAVIAN PROX PSV: 101.6 CM/S

## 2023-01-20 PROCEDURE — 93306 TTE W/DOPPLER COMPLETE: CPT

## 2023-01-20 PROCEDURE — 93880 EXTRACRANIAL BILAT STUDY: CPT

## 2023-01-23 LAB
LEFT CCA DIST DIAS: 16.3 CM/S
LEFT CCA DIST SYS: 62.9 CM/S
LEFT CCA MID DIAS: 17.85 CM/S
LEFT CCA MID SYS: 69.95 CM/S
LEFT CCA PROX DIAS: 17.2 CM/S
LEFT CCA PROX SYS: 83.5 CM/S
LEFT ECA DIAS: 11.2 CM/S
LEFT ECA SYS: 101.8 CM/S
LEFT ICA DIST DIAS: 24.9 CM/S
LEFT ICA DIST SYS: 79.2 CM/S
LEFT ICA MID DIAS: 24.3 CM/S
LEFT ICA MID SYS: 71.1 CM/S
LEFT ICA PROX DIAS: 13.8 CM/S
LEFT ICA PROX SYS: 55.4 CM/S
LEFT ICA/CCA SYS: 1.26 NO UNITS
LEFT VERTEBRAL DIAS: 11.69 CM/S
LEFT VERTEBRAL SYS: 49 CM/S
RIGHT CCA DIST DIAS: 12.2 CM/S
RIGHT CCA DIST SYS: 48.6 CM/S
RIGHT CCA MID DIAS: 12.3 CM/S
RIGHT CCA MID SYS: 52.38 CM/S
RIGHT CCA PROX DIAS: 15.7 CM/S
RIGHT CCA PROX SYS: 65.3 CM/S
RIGHT ECA DIAS: 8.33 CM/S
RIGHT ECA SYS: 78.7 CM/S
RIGHT ICA DIST DIAS: 21.2 CM/S
RIGHT ICA DIST SYS: 68.3 CM/S
RIGHT ICA MID DIAS: 19.7 CM/S
RIGHT ICA MID SYS: 72.8 CM/S
RIGHT ICA PROX DIAS: 12.6 CM/S
RIGHT ICA PROX SYS: 52.4 CM/S
RIGHT ICA/CCA SYS: 1.5 NO UNITS
RIGHT VERTEBRAL DIAS: 12.97 CM/S
RIGHT VERTEBRAL SYS: 46.7 CM/S
VAS LEFT SUBCLAVIAN PROX PSV: 75.1 CM/S
VAS RIGHT SUBCLAVIAN PROX PSV: 101.6 CM/S

## 2023-02-01 DIAGNOSIS — M81.0 POST-MENOPAUSAL OSTEOPOROSIS: Primary | ICD-10-CM

## 2023-02-04 DIAGNOSIS — M81.0 POST-MENOPAUSAL OSTEOPOROSIS: Primary | ICD-10-CM

## 2023-08-31 ENCOUNTER — TRANSCRIBE ORDERS (OUTPATIENT)
Facility: HOSPITAL | Age: 68
End: 2023-08-31

## 2023-08-31 DIAGNOSIS — Z12.31 SCREENING MAMMOGRAM FOR HIGH-RISK PATIENT: Primary | ICD-10-CM

## 2023-09-27 ENCOUNTER — HOSPITAL ENCOUNTER (OUTPATIENT)
Age: 68
Discharge: HOME | End: 2023-09-30
Attending: INTERNAL MEDICINE
Payer: MEDICARE

## 2023-09-27 VITALS — HEIGHT: 65 IN | BODY MASS INDEX: 23.66 KG/M2 | WEIGHT: 142 LBS

## 2023-09-27 DIAGNOSIS — Z12.31 SCREENING MAMMOGRAM FOR HIGH-RISK PATIENT: ICD-10-CM

## 2023-09-27 PROCEDURE — 77063 BREAST TOMOSYNTHESIS BI: CPT

## 2024-10-30 ENCOUNTER — HOSPITAL ENCOUNTER (OUTPATIENT)
Age: 69
Discharge: HOME | End: 2024-11-01
Attending: INTERNAL MEDICINE
Payer: MEDICARE

## 2024-10-30 VITALS
SYSTOLIC BLOOD PRESSURE: 138 MMHG | HEIGHT: 65 IN | BODY MASS INDEX: 23.66 KG/M2 | WEIGHT: 142 LBS | DIASTOLIC BLOOD PRESSURE: 84 MMHG

## 2024-10-30 DIAGNOSIS — R09.89 BILATERAL CAROTID BRUITS: ICD-10-CM

## 2024-10-30 DIAGNOSIS — I42.2 HYPERTROPHIC CARDIOMYOPATHY (HCC): ICD-10-CM

## 2024-10-30 DIAGNOSIS — I42.9 CARDIOMYOPATHY, UNSPECIFIED TYPE (HCC): ICD-10-CM

## 2024-10-30 LAB
ECHO AO ROOT DIAM: 2.9 CM
ECHO AO ROOT INDEX: 1.7 CM/M2
ECHO AR MAX VEL PISA: 4.3 M/S
ECHO AV AREA PEAK VELOCITY: 2.5 CM2
ECHO AV AREA VTI: 2.7 CM2
ECHO AV AREA/BSA PEAK VELOCITY: 1.5 CM2/M2
ECHO AV AREA/BSA VTI: 1.6 CM2/M2
ECHO AV MEAN GRADIENT: 2 MMHG
ECHO AV MEAN VELOCITY: 0.7 M/S
ECHO AV PEAK GRADIENT: 5 MMHG
ECHO AV PEAK VELOCITY: 1.1 M/S
ECHO AV REGURGITANT PHT: 1005.2 MILLISECOND
ECHO AV VELOCITY RATIO: 0.64
ECHO AV VTI: 27.3 CM
ECHO BSA: 1.72 M2
ECHO EST RA PRESSURE: 8 MMHG
ECHO IVC PROX: 2.2 CM
ECHO LA DIAMETER INDEX: 2.16 CM/M2
ECHO LA DIAMETER: 3.7 CM
ECHO LA TO AORTIC ROOT RATIO: 1.28
ECHO LA VOL A-L A2C: 47 ML (ref 22–52)
ECHO LA VOL A-L A4C: 47 ML (ref 22–52)
ECHO LA VOL BP: 46 ML (ref 22–52)
ECHO LA VOL MOD A2C: 45 ML (ref 22–52)
ECHO LA VOL MOD A4C: 45 ML (ref 22–52)
ECHO LA VOL/BSA BIPLANE: 27 ML/M2 (ref 16–34)
ECHO LA VOLUME AREA LENGTH: 48 ML
ECHO LA VOLUME INDEX A-L A2C: 27 ML/M2 (ref 16–34)
ECHO LA VOLUME INDEX A-L A4C: 27 ML/M2 (ref 16–34)
ECHO LA VOLUME INDEX AREA LENGTH: 28 ML/M2 (ref 16–34)
ECHO LA VOLUME INDEX MOD A2C: 26 ML/M2 (ref 16–34)
ECHO LA VOLUME INDEX MOD A4C: 26 ML/M2 (ref 16–34)
ECHO LV E' LATERAL VELOCITY: 7.1 CM/S
ECHO LV E' SEPTAL VELOCITY: 6.3 CM/S
ECHO LV EF PHYSICIAN: 60 %
ECHO LV EJECTION FRACTION A2C: 58 %
ECHO LV EJECTION FRACTION A4C: 57 %
ECHO LV EJECTION FRACTION BIPLANE: 58 % (ref 55–100)
ECHO LV FRACTIONAL SHORTENING: 31 % (ref 28–44)
ECHO LV GLOBAL LONGITUDINAL STRAIN (GLS): 17.5 %
ECHO LV INTERNAL DIMENSION DIASTOLE INDEX: 2.46 CM/M2
ECHO LV INTERNAL DIMENSION DIASTOLIC: 4.2 CM (ref 3.9–5.3)
ECHO LV INTERNAL DIMENSION SYSTOLIC INDEX: 1.7 CM/M2
ECHO LV INTERNAL DIMENSION SYSTOLIC: 2.9 CM
ECHO LV IVSD: 1.1 CM (ref 0.6–0.9)
ECHO LV MASS 2D: 147 G (ref 67–162)
ECHO LV MASS INDEX 2D: 86 G/M2 (ref 43–95)
ECHO LV POSTERIOR WALL DIASTOLIC: 1 CM (ref 0.6–0.9)
ECHO LV RELATIVE WALL THICKNESS RATIO: 0.48
ECHO LVOT AREA: 3.8 CM2
ECHO LVOT AV VTI INDEX: 0.71
ECHO LVOT DIAM: 2.2 CM
ECHO LVOT MEAN GRADIENT: 1 MMHG
ECHO LVOT PEAK GRADIENT: 2 MMHG
ECHO LVOT PEAK VELOCITY: 0.7 M/S
ECHO LVOT STROKE VOLUME INDEX: 43.3 ML/M2
ECHO LVOT SV: 74.1 ML
ECHO LVOT VTI: 19.5 CM
ECHO MAIN PULMONARY ARTERY DIAMETER: 2 CM
ECHO MV A VELOCITY: 0.8 M/S
ECHO MV AREA VTI: 2.3 CM2
ECHO MV E DECELERATION TIME (DT): 129.8 MS
ECHO MV E VELOCITY: 0.65 M/S
ECHO MV E/A RATIO: 0.81
ECHO MV E/E' LATERAL: 9.15
ECHO MV E/E' RATIO (AVERAGED): 9.74
ECHO MV E/E' SEPTAL: 10.32
ECHO MV EROA PISA: 0.1 CM2
ECHO MV LVOT VTI INDEX: 1.69
ECHO MV MAX VELOCITY: 0.8 M/S
ECHO MV MEAN GRADIENT: 1 MMHG
ECHO MV MEAN VELOCITY: 0.4 M/S
ECHO MV PEAK GRADIENT: 2 MMHG
ECHO MV REGURGITANT ALIASING (NYQUIST) VELOCITY: 30 CM/S
ECHO MV REGURGITANT RADIUS PISA: 0.64 CM
ECHO MV REGURGITANT VELOCITY PISA: 5.6 M/S
ECHO MV REGURGITANT VOLUME PISA: 26.46 ML
ECHO MV REGURGITANT VTIA: 192 CM
ECHO MV VTI: 32.9 CM
ECHO PULMONARY ARTERY END DIASTOLIC PRESSURE: 3 MMHG
ECHO PV AREA CONTINUITY EQ VELOCITY: 2.3 CM2
ECHO PV MAX VELOCITY: 0.9 M/S
ECHO PV MEAN GRADIENT: 2 MMHG
ECHO PV MEAN VELOCITY: 0.6 M/S
ECHO PV PEAK GRADIENT: 3 MMHG
ECHO PV REGURGITANT MAX VELOCITY: 0.8 M/S
ECHO PVEIN A DURATION: 118 MS
ECHO PVEIN A VELOCITY: 0.2 M/S
ECHO PVEIN PEAK D VELOCITY: 0.5 M/S
ECHO PVEIN PEAK S VELOCITY: 0.6 M/S
ECHO PVEIN S/D RATIO: 1.2
ECHO QP:QS RATIO: 0.69
ECHO RA END SYSTOLIC VOLUME APICAL 4 CHAMBER INDEX BSA: 13 ML/M2
ECHO RA VOLUME BIPLANE METHOD OF DISKS: 21 ML
ECHO RA VOLUME INDEX BP: 12 ML/M2
ECHO RA VOLUME: 22 ML
ECHO RIGHT VENTRICULAR SYSTOLIC PRESSURE (RVSP): 44 MMHG
ECHO RV FRACTIONAL AREA CHANGE: 40 %
ECHO RV GLOBAL SYSTOLIC STRAIN (GLS): -23 %
ECHO RV TAPSE: 1.9 CM (ref 1.7–?)
ECHO RV WALL THICKNESS: 0.5 CM
ECHO RVOT AREA: 3.8 CM2
ECHO RVOT DIAMETER: 2.2 CM
ECHO RVOT MEAN GRADIENT: 1 MMHG
ECHO RVOT PEAK GRADIENT: 1 MMHG
ECHO RVOT PEAK VELOCITY: 0.6 M/S
ECHO RVOT STROKE VOLUME: 50.9 ML
ECHO RVOT VTI: 13.4 CM
ECHO TV REGURGITANT MAX VELOCITY: 3.02 M/S
ECHO TV REGURGITANT PEAK GRADIENT: 36 MMHG
VAS LEFT BULB EDV: 19 CM/S
VAS LEFT BULB PSV: 64 CM/S
VAS LEFT CCA DIST EDV: 24.9 CM/S
VAS LEFT CCA DIST PSV: 78.3 CM/S
VAS LEFT CCA MID EDV: 23 CM/S
VAS LEFT CCA MID PSV: 85.1 CM/S
VAS LEFT CCA PROX EDV: 21.1 CM/S
VAS LEFT CCA PROX PSV: 92.6 CM/S
VAS LEFT ECA PSV: 95.7 CM/S
VAS LEFT ICA DIST EDV: 26.9 CM/S
VAS LEFT ICA DIST PSV: 80.8 CM/S
VAS LEFT ICA MID EDV: 19.9 CM/S
VAS LEFT ICA MID PSV: 72.1 CM/S
VAS LEFT ICA PROX EDV: 27.3 CM/S
VAS LEFT ICA PROX PSV: 75.8 CM/S
VAS LEFT ICA/CCA PSV: 1.04
VAS LEFT SUBCLAVIAN MID PSV: 126 CM/S
VAS LEFT VERTEBRAL EDV: 15.1 CM/S
VAS LEFT VERTEBRAL PSV: 54 CM/S
VAS RIGHT BULB EDV: 24 CM/S
VAS RIGHT BULB PSV: 88 CM/S
VAS RIGHT CCA DIST EDV: 28 CM/S
VAS RIGHT CCA DIST PSV: 94.6 CM/S
VAS RIGHT CCA MID EDV: 31.5 CM/S
VAS RIGHT CCA MID PSV: 120 CM/S
VAS RIGHT CCA PROX EDV: 31.7 CM/S
VAS RIGHT CCA PROX PSV: 183 CM/S
VAS RIGHT ECA PSV: 116 CM/S
VAS RIGHT ICA DIST EDV: 39.2 CM/S
VAS RIGHT ICA DIST PSV: 99.5 CM/S
VAS RIGHT ICA MID EDV: 33.6 CM/S
VAS RIGHT ICA MID PSV: 95.3 CM/S
VAS RIGHT ICA PROX EDV: 23.1 CM/S
VAS RIGHT ICA PROX PSV: 86.9 CM/S
VAS RIGHT ICA/CCA PSV: 1.05
VAS RIGHT SUBCLAVIAN MID PSV: 129 CM/S
VAS RIGHT VERTEBRAL EDV: 20.3 CM/S
VAS RIGHT VERTEBRAL PSV: 63.1 CM/S

## 2024-10-30 PROCEDURE — 93306 TTE W/DOPPLER COMPLETE: CPT

## 2024-10-30 PROCEDURE — 93880 EXTRACRANIAL BILAT STUDY: CPT

## 2024-12-17 ENCOUNTER — HOSPITAL ENCOUNTER (OUTPATIENT)
Age: 69
Discharge: HOME | End: 2024-12-20
Attending: INTERNAL MEDICINE
Payer: MEDICARE

## 2024-12-17 VITALS — HEIGHT: 65 IN | BODY MASS INDEX: 23.66 KG/M2 | WEIGHT: 142 LBS

## 2024-12-17 DIAGNOSIS — Z12.31 VISIT FOR SCREENING MAMMOGRAM: ICD-10-CM

## 2024-12-17 PROCEDURE — 77067 SCR MAMMO BI INCL CAD: CPT

## 2024-12-27 ENCOUNTER — APPOINTMENT (OUTPATIENT)
Age: 69
End: 2024-12-27
Payer: MEDICARE

## 2024-12-27 ENCOUNTER — HOSPITAL ENCOUNTER (EMERGENCY)
Age: 69
Discharge: ANOTHER HOSPITAL | End: 2024-12-27
Attending: EMERGENCY MEDICINE
Payer: MEDICARE

## 2024-12-27 VITALS
RESPIRATION RATE: 20 BRPM | TEMPERATURE: 98 F | HEART RATE: 94 BPM | OXYGEN SATURATION: 100 % | HEIGHT: 65 IN | DIASTOLIC BLOOD PRESSURE: 72 MMHG | BODY MASS INDEX: 24.16 KG/M2 | WEIGHT: 145 LBS | SYSTOLIC BLOOD PRESSURE: 133 MMHG

## 2024-12-27 DIAGNOSIS — S09.90XA INJURY OF HEAD, INITIAL ENCOUNTER: ICD-10-CM

## 2024-12-27 DIAGNOSIS — S22.41XA CLOSED FRACTURE OF MULTIPLE RIBS OF RIGHT SIDE, INITIAL ENCOUNTER: Primary | ICD-10-CM
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
## 2024-12-27 DIAGNOSIS — S42.111A CLOSED DISPLACED FRACTURE OF BODY OF RIGHT SCAPULA, INITIAL ENCOUNTER: ICD-10-CM

## 2024-12-27 LAB
ALBUMIN SERPL-MCNC: 3.9 G/DL (ref 3.4–5)
ALBUMIN/GLOB SERPL: 1 (ref 0.8–1.7)
ALP SERPL-CCNC: 112 U/L (ref 45–117)
ALT SERPL-CCNC: 30 U/L (ref 13–56)
ANION GAP SERPL CALC-SCNC: 9 MMOL/L (ref 3–18)
AST SERPL W P-5'-P-CCNC: 35 U/L (ref 10–38)
BASOPHILS # BLD: 0 K/UL (ref 0–0.1)
BASOPHILS NFR BLD: 0 % (ref 0–2)
BILIRUB DIRECT SERPL-MCNC: 0.2 MG/DL (ref 0–0.2)
BILIRUB SERPL-MCNC: 0.7 MG/DL (ref 0.2–1)
BNP SERPL-MCNC: 109 PG/ML (ref 0–900)
BUN SERPL-MCNC: 16 MG/DL (ref 7–18)
BUN/CREAT SERPL: 21 (ref 12–20)
CA-I BLD-MCNC: 8.8 MG/DL (ref 8.5–10.1)
CHLORIDE SERPL-SCNC: 107 MMOL/L (ref 100–111)
CO2 SERPL-SCNC: 28 MMOL/L (ref 21–32)
CREAT SERPL-MCNC: 0.77 MG/DL (ref 0.6–1.3)
DIFFERENTIAL METHOD BLD: ABNORMAL
EOSINOPHIL # BLD: 0 K/UL (ref 0–0.4)
EOSINOPHIL NFR BLD: 0 % (ref 0–5)
ERYTHROCYTE [DISTWIDTH] IN BLOOD BY AUTOMATED COUNT: 12.9 % (ref 11.6–14.5)
ETHANOL SERPL-MCNC: <3 MG/DL (ref 0–3)
GLOBULIN SER CALC-MCNC: 4.1 G/DL (ref 2–4)
GLUCOSE SERPL-MCNC: 143 MG/DL (ref 74–99)
HCT VFR BLD AUTO: 34.8 % (ref 35–45)
HGB BLD-MCNC: 11.5 G/DL (ref 12–16)
IMM GRANULOCYTES # BLD AUTO: 0.1 K/UL (ref 0–0.04)
IMM GRANULOCYTES NFR BLD AUTO: 1 % (ref 0–0.5)
LYMPHOCYTES # BLD: 1.2 K/UL (ref 0.9–3.6)
LYMPHOCYTES NFR BLD: 16 % (ref 21–52)
MCH RBC QN AUTO: 30.7 PG (ref 24–34)
MCHC RBC AUTO-ENTMCNC: 33 G/DL (ref 31–37)
MCV RBC AUTO: 93 FL (ref 78–100)
MONOCYTES # BLD: 0.4 K/UL (ref 0.05–1.2)
MONOCYTES NFR BLD: 5 % (ref 3–10)
NEUTS SEG # BLD: 5.8 K/UL (ref 1.8–8)
NEUTS SEG NFR BLD: 78 % (ref 40–73)
NRBC # BLD: 0 K/UL (ref 0–0.01)
NRBC BLD-RTO: 0 PER 100 WBC
PLATELET # BLD AUTO: 299 K/UL (ref 135–420)
PMV BLD AUTO: 10.2 FL (ref 9.2–11.8)
POTASSIUM SERPL-SCNC: 3.3 MMOL/L (ref 3.5–5.5)
PROT SERPL-MCNC: 8 G/DL (ref 6.4–8.2)
RBC # BLD AUTO: 3.74 M/UL (ref 4.2–5.3)
SODIUM SERPL-SCNC: 144 MMOL/L (ref 136–145)
TROPONIN I SERPL HS-MCNC: 5 NG/L (ref 0–54)
WBC # BLD AUTO: 7.5 K/UL (ref 4.6–13.2)

## 2024-12-27 PROCEDURE — 72125 CT NECK SPINE W/O DYE: CPT

## 2024-12-27 PROCEDURE — 80048 BASIC METABOLIC PNL TOTAL CA: CPT

## 2024-12-27 PROCEDURE — 70450 CT HEAD/BRAIN W/O DYE: CPT

## 2024-12-27 PROCEDURE — 84484 ASSAY OF TROPONIN QUANT: CPT

## 2024-12-27 PROCEDURE — 71260 CT THORAX DX C+: CPT

## 2024-12-27 PROCEDURE — 99285 EMERGENCY DEPT VISIT HI MDM: CPT

## 2024-12-27 PROCEDURE — 82077 ASSAY SPEC XCP UR&BREATH IA: CPT

## 2024-12-27 PROCEDURE — 83880 ASSAY OF NATRIURETIC PEPTIDE: CPT

## 2024-12-27 PROCEDURE — 96376 TX/PRO/DX INJ SAME DRUG ADON: CPT

## 2024-12-27 PROCEDURE — 6360000002 HC RX W HCPCS: Performed by: EMERGENCY MEDICINE

## 2024-12-27 PROCEDURE — 85025 COMPLETE CBC W/AUTO DIFF WBC: CPT

## 2024-12-27 PROCEDURE — 80076 HEPATIC FUNCTION PANEL: CPT

## 2024-12-27 PROCEDURE — 6360000004 HC RX CONTRAST MEDICATION: Performed by: EMERGENCY MEDICINE

## 2024-12-27 PROCEDURE — 96375 TX/PRO/DX INJ NEW DRUG ADDON: CPT

## 2024-12-27 PROCEDURE — 73060 X-RAY EXAM OF HUMERUS: CPT

## 2024-12-27 PROCEDURE — 96374 THER/PROPH/DIAG INJ IV PUSH: CPT

## 2024-12-27 PROCEDURE — 93005 ELECTROCARDIOGRAM TRACING: CPT | Performed by: EMERGENCY MEDICINE

## 2024-12-27 RX ORDER — ONDANSETRON 2 MG/ML
4 INJECTION INTRAMUSCULAR; INTRAVENOUS ONCE
Status: COMPLETED | OUTPATIENT
Start: 2024-12-27 | End: 2024-12-27

## 2024-12-27 RX ORDER — IOPAMIDOL 755 MG/ML
100 INJECTION, SOLUTION INTRAVASCULAR
Status: COMPLETED | OUTPATIENT
Start: 2024-12-27 | End: 2024-12-27

## 2024-12-27 RX ADMIN — HYDROMORPHONE HYDROCHLORIDE 1 MG: 1 INJECTION, SOLUTION INTRAMUSCULAR; INTRAVENOUS; SUBCUTANEOUS at 07:50

## 2024-12-27 RX ADMIN — IOPAMIDOL 100 ML: 755 INJECTION, SOLUTION INTRAVENOUS at 08:20

## 2024-12-27 RX ADMIN — ONDANSETRON 4 MG: 2 INJECTION, SOLUTION INTRAMUSCULAR; INTRAVENOUS at 13:55

## 2024-12-27 RX ADMIN — HYDROMORPHONE HYDROCHLORIDE 1 MG: 1 INJECTION, SOLUTION INTRAMUSCULAR; INTRAVENOUS; SUBCUTANEOUS at 13:52

## 2024-12-27 RX ADMIN — ONDANSETRON 4 MG: 2 INJECTION, SOLUTION INTRAMUSCULAR; INTRAVENOUS at 07:51

## 2024-12-27 ASSESSMENT — PAIN SCALES - GENERAL: PAINLEVEL_OUTOF10: 10

## 2024-12-27 ASSESSMENT — PAIN - FUNCTIONAL ASSESSMENT: PAIN_FUNCTIONAL_ASSESSMENT: 0-10

## 2024-12-27 NOTE — ED NOTES
Pt fell, from standing position, onto wood floor at approx. 0500 this morning. Pt crawled about 15ft to pull herself up.  Pt has right shoulder and right rib pain. She states that has shortness of breath since the fall.     Pharmacy Zay Chester PCP    NKDA

## 2024-12-27 NOTE — ED PROVIDER NOTES
EMERGENCY DEPARTMENT HISTORY AND PHYSICAL EXAM    Date: 12/27/2024  Patient Name: Kelly Avila    History of Presenting Illness     Chief complaint:  Fall with arm, possibly other injuries      History Provided By: Patient and high cholesterol, hypertrophic cardiomyopathy    Additional History (Context):   6:49 AM EST  Kelly Avila is a 69 y.o. female with PMHX of hypertrophic cardiomyopathy, who presents to the emergency department C/O fall and chest injury.  Patient states she was leaning over to feed the cat when she fell forward.  She denied hitting her head however there is a small cut and dried blood noted to the left supraorbital ridge.    Patient denied any significant cardiac problems however chart demonstrates hypertrophic cardiomyopathy with nonrheumatic mitral valve prolapse.  Echo showed moderate regurgitation along with grade 1 diastolic dysfunction.  Overall left ventricular size was document is normal with some mild to moderate wall thickness normal wall motion.      She denies use of aspirin Plavix or any other, blood thinners      Social History  Denies smoking drinking or drugs.    Family History  Unremarkable for family history of bleeding disorders according to patient.    PCP: Rosendo Chester MD    No current facility-administered medications for this encounter.     Current Outpatient Medications   Medication Sig Dispense Refill    alendronate (FOSAMAX) 70 MG tablet TAKE 1 TABLET BY MOUTH ONCE A WEEK IN THE MORNING AT LEAST 30 MINUTES BEFORE FIRST FOOD, BEVERAGE OR MEDICATION OF THE DAY      ergocalciferol (ERGOCALCIFEROL) 1.25 MG (50414 UT) capsule TAKE 1 CAPSULE BY MOUTH ONCE EVERY MONTH      levothyroxine (SYNTHROID) 50 MCG tablet TAKE 1 TABLET BY MOUTH ONCE DAILY FOR THYROID      omeprazole (PRILOSEC) 40 MG delayed release capsule TAKE 1 CAPSULE BY MOUTH ONCE DAILY IN THE MORNING 15 MINUTES PRIOR TO BREAKFAST      simvastatin (ZOCOR) 20 MG tablet TAKE 1 TABLET BY MOUTH  severity.  I was called and immediate upon arrival to shift to see patient with a probable \"traumatic chest injury \", inquiring for the need of a clavicle x-ray.    Patient fell striking the head in addition to having prominent right flank pain, markedly tender in this area without exquisite bruising or noted skin bleeding.  Nurse noticed that she states having significant pain on that side along with \"I felt something pop\" during palpation.    Patient will obtain CT trauma series head for minor to moderate head injury and C-spine as she failed Nexus criteria due to distracting pain.  Neurologically there is no evidence of deficits.  Distal pulses in all 4 extremities feels good.  She has got very significant pain to the right flank + associated with this will give her pain medication scan her thoracoabdominal with IV contrast to look for pneumothorax bleeding injury effusion, thoracoabdominal injury affecting her liver.  Possible but unlikely kidney involvement.  She is gets minor flank tenderness.  Also urine will start IV fluids as needed.    No need for sutures tetanus antibiotics at this point          Social Determinants of Health     Tobacco Use: Low Risk  (12/17/2024)    Patient History     Smoking Tobacco Use: Never     Smokeless Tobacco Use: Never     Passive Exposure: Not on file   Alcohol Use: Not on file   Financial Resource Strain: Not on file   Food Insecurity: Not on file   Transportation Needs: Not on file   Physical Activity: Not on file   Stress: Not on file   Social Connections: Not on file   Intimate Partner Violence: Not on file   Depression: Not at risk (11/17/2022)    PHQ-2     PHQ-2 Score: 0   Housing Stability: Not on file   Interpersonal Safety: Not on file   Utilities: Not on file       Procedures:  Procedures    ED Course:   6:49 AM EST: Initial assessment performed. The patients presenting problems have been discussed, and they are in agreement with the care plan formulated and outlined    Physical Activity: Not on file   Stress: Not on file   Social Connections: Not on file   Intimate Partner Violence: Unknown (12/28/2024)    Received from [facility]    Humiliation, Afraid, Rape, and Kick questionnaire     Fear of Current or Ex-Partner: Not on file     Emotionally Abused: Not on file     Physically Abused: No     Sexually Abused: Not on file   Depression: Not at risk (11/17/2022)    PHQ-2     PHQ-2 Score: 0   Housing Stability: Unknown (12/28/2024)    Received from [facility]    Housing Stability Vital Sign     Unable to Pay for Housing in the Last Year: No     Number of Times Moved in the Last Year: Not on file     Homeless in the Last Year: Not on file   Interpersonal Safety: Unknown (12/28/2024)    Received from [facility]    Humiliation, Afraid, Rape, and Kick questionnaire     Fear of Current or Ex-Partner: Not on file     Emotionally Abused: Not on file     Physically Abused: No     Sexually Abused: Not on file   Utilities: Not At Risk (12/28/2024)    Received from [facility] Utilities     Threatened with loss of utilities: No       Procedures:  Procedures    ED Course:   6:49 AM EST: Initial assessment performed. The patients presenting problems have been discussed, and they are in agreement with the care plan formulated and outlined with them.  I have encouraged them to ask questions as they arise throughout their visit.    7:39 AM  Progress note  X-rays reviewed showing evidence of normal right humerus however shoulder pain coming from visible scapular fracture.  Patient given pain medication and will continue with full trauma series.    8:35 AM  Progress note  Patient splinting associated with pain and pain medication.  O2 sats dropped to the high 80s, resolved to 100% with 2 L nasal cannula.    CONSULT NOTE:   10:55 AM  Delfin Tucker MD   spoke with Toro Lorenzana DO    Specialty: Emergency physician [place]

## 2024-12-29 LAB
EKG ATRIAL RATE: 80 BPM
EKG DIAGNOSIS: NORMAL
EKG P AXIS: 68 DEGREES
EKG P-R INTERVAL: 146 MS
EKG Q-T INTERVAL: 406 MS
EKG QRS DURATION: 76 MS
EKG QTC CALCULATION (BAZETT): 468 MS
EKG R AXIS: -19 DEGREES
EKG T AXIS: -20 DEGREES
EKG VENTRICULAR RATE: 80 BPM

## 2025-01-20 ENCOUNTER — HOSPITAL ENCOUNTER (OUTPATIENT)
Age: 70
Setting detail: RECURRING SERIES
Discharge: HOME | End: 2025-01-23
Payer: MEDICARE

## 2025-01-20 PROCEDURE — 97161 PT EVAL LOW COMPLEX 20 MIN: CPT

## 2025-01-20 NOTE — THERAPY EVALUATION
[facility] REHABILITATION  PHYSICAL THERAPY  [facility], [address] [place]  [address]  Phone: [phone]    Fax: [phone]     PLAN OF CARE / STATEMENT OF MEDICAL NECESSITY FOR PHYSICAL THERAPY SERVICES  Patient Name: Kelly Avila : 1955   Medical   Diagnosis: Fracture of unspecified part of scapula, right shoulder, subsequent encounter for fracture with routine healing [S42.101D] Treatment Diagnosis: R shoulder pain   Onset Date: 24     Referral Source: Belen Hinds APRN * Start of Care (SOC): 2025   Prior Hospitalization: See medical history Provider #: [id number]   Prior Level of Function: Independent    Comorbidities: Past Medical History:   Diagnosis Date    Hypercholesterolemia     Menopause         Medications: Verified on Patient Summary List   The Plan of Care and following information is based on the information from the initial evaluation.   ==========================================================================================  Assessment / Functional Analysis:    Pt is a 69 y.o. year old  female who presents to outpatient clinic today s/p R scapula fx on 24. Pt had acute stay from 24-24 and transitioned to SNF from 24-1/10/25. Per ortho note from hospital stay, patient is able to advance ROM as tolerated, but has 1-2 lb WB restriction. Pt has yet to f/u with ortho, and encouraged to set up appointment to progress with therapy as able. Objectively she presents with increased pain, decreased ROM, decreased strength, and 27.3% QuickDASH denoting increased disability. She would benefit from skilled PT to address above listed impairments in function to optimize quality of life and independence.       ==========================================================================================  Eval Complexity: History: MEDIUM  Complexity : 1-2 comorbidities / personal factors will impact the outcome/ POC

## 2025-01-20 NOTE — THERAPY EVALUATION
PT DAILY TREATMENT NOTE/SHOULDER EVAL 10-18    Patient Name: Kelly Avila  Date:2025  : 1955  [x]  Patient  Verified  Payor: [employer] MEDICARE / Plan: [employer] CHOICE-PPO MEDICARE / Product Type: *No Product type* /    In time:10:02  Out time:10:38  Total Treatment Time (min): 36  Visit #: 1    Medicare/BCBS Only   Total Timed Codes (min):  0 1:1 Treatment Time:  36     Treatment Area: Fracture of unspecified part of scapula, right shoulder, subsequent encounter for fracture with routine healing [S42.101D]    SUBJECTIVE  Pt reports to OPPT s/p R scapula fx on 24 after falling in her kitchen. Pt spent approximately 5-6 days in SNF after acute hospitalization. Pt has been cleared to return home with outpatient therapy. Pt notes that she does not have f/u with orthopedic surgeon at this time. Pt denies any restrictions from her dr. Per chart review, she has 1-2# lifting restriction for ADLs and may advance ROM as tolerated. Pt lives with son in one story home with three steps to enter and B handrails. Pt has d/c sling. Pt describes her pain as \"just hurts.\"      Pt. Goals: \"I want to get my shoulder right.\"    Pain Level (0-10 scale): Current 5/10   Best:  5/10   Worst: 5/10  [x]constant []intermittent [x]improving []worsening []no change since onset      PMH:   Past Medical History:   Diagnosis Date    Hypercholesterolemia     Menopause      Past Surgical History:   Procedure Laterality Date    COLONOSCOPY N/A 2022    COLONOSCOPY performed by Rahul Redman MD at [facility]         Imagin24  INDICATION: Fall arm injury.     COMPARISON: None.     FINDINGS: Two views of the right humerus demonstrate comminuted fracture body of  the scapula..     IMPRESSION:  There is a comminuted fracture body of the right scapula..     Prior Treatment: PT/OT in SNF       Any medication changes, allergies to medications, adverse drug reactions, diagnosis change, or new procedure

## 2025-01-24 ENCOUNTER — HOSPITAL ENCOUNTER (OUTPATIENT)
Age: 70
Setting detail: RECURRING SERIES
Discharge: HOME | End: 2025-01-27
Payer: MEDICARE

## 2025-01-24 PROCEDURE — 97140 MANUAL THERAPY 1/> REGIONS: CPT

## 2025-01-24 PROCEDURE — 97016 VASOPNEUMATIC DEVICE THERAPY: CPT

## 2025-01-24 PROCEDURE — 97110 THERAPEUTIC EXERCISES: CPT

## 2025-01-24 NOTE — PROGRESS NOTES
post-treatment:  []intact []redness- no adverse reaction       []redness - adverse reaction:      min Group Therapy: Time overlapped with another patient      36 min Therapeutic Exercise:  [x] See flow sheet :   Rationale: increase ROM, increase strength, and increase proprioception to improve the patient’s ability to return to prior level of function before injury/illness with reduced pain, achieving optimal strength and function to perform household tasks, daily activities, and return to community events, and/or work.       min Therapeutic Activity:  []  See flow sheet :   Rationale:       min Neuromuscular Re-education:  []  See flow sheet :   Rationale:       8 min Manual Therapy:  PROM to R shoulder in flexion and abduction   Rationale: decrease pain, increase ROM, and increase tissue extensibility to reduce muscle tension, improve ROM, and effort to achieve patient's full potential to return to PLOF.     min Gait Training:  ___ feet with ___ device on level surfaces with ___ level of assist   Rationale:           With TE  TA   NR  GT   Misc Patient Education: [x] Review HEP    [] Progressed/Changed HEP based on:   [] positioning   [] body mechanics   [] transfers   [] heat/ice application          Pain Level (0-10 scale) post treatment: 0/10    ASSESSMENT/Changes in Function: Session began with MH to R shoulder as documented above.  Followed by introduction of AAROM via pulleys in flexion and abduction, table slides in flexion and abd, shoulder rolls, scapular retractions and wall ladder in flexion and abduction.  Supine AAROM using wand in flexion and abduction. PROM to R shoulder as documented above. No adverse reactions with exercise. Pain is absent at end of treatment. Vaso compression applied to R shoulder post exercise. Plan to continue with POC.     Patient will continue to benefit from skilled PT services to modify and progress therapeutic interventions, analyze and address functional mobility

## 2025-01-27 ENCOUNTER — APPOINTMENT (OUTPATIENT)
Age: 70
End: 2025-01-27
Payer: MEDICARE

## 2025-01-31 ENCOUNTER — HOSPITAL ENCOUNTER (OUTPATIENT)
Age: 70
Setting detail: RECURRING SERIES
End: 2025-01-31
Payer: MEDICARE

## 2025-01-31 PROCEDURE — 97110 THERAPEUTIC EXERCISES: CPT

## 2025-01-31 PROCEDURE — 97140 MANUAL THERAPY 1/> REGIONS: CPT

## 2025-01-31 PROCEDURE — 97016 VASOPNEUMATIC DEVICE THERAPY: CPT

## 2025-01-31 NOTE — PROGRESS NOTES
PT DAILY TREATMENT NOTE     Patient Name: Kelly Avila  Date:2025  : 1955  [x]  Patient  Verified  Payor: HU[employer]NA MEDICARE / Plan: [employer] CHOICE-PPO MEDICARE / Product Type: *No Product type* /    In time:1350  Out time:1451  Total Treatment Time (min): 61  Total Timed Codes (min): 41  1:1 Treatment Time (min): 41   Visit #: 3 of 10    Treatment Area: Fracture of unspecified part of scapula, right shoulder, subsequent encounter for fracture with routine healing [S42.101D]    SUBJECTIVE  Pt arrives with complaint of pain 5/10 and states that she \"is not sleeping good at night because I can not lay on that side and that's how I like to sleep.\"     Pain Level (0-10 scale): 5/10    Any medication changes, allergies to medications, adverse drug reactions, diagnosis change, or new procedure performed?: [x] No    [] Yes (see summary sheet for update)        OBJECTIVE  Modality rationale: MH pre on R shoulder in effort to increase tissue extensibility to improve the patient’s ability to participate in today's exercise program. Vaso compression applied at end of treatment in effort to reduce pain and optimally heal with improved mobility.    Min Type Additional Details    [] Estim: []Att   []Unatt  []TENS instruct                 []IFC  []Premod []NMES                       []Other:  []w/US   []w/ice   []w/heat  Position:  Location:    []  Traction: [] Cervical       []Lumbar                       [] Prone          []Supine                       []Intermittent   []Continuous Lbs:  [] before manual  [] after manual    []  Ultrasound: []Continuous   [] Pulsed                           []1MHz   []3MHz Location:  W/cm2:    []  Iontophoresis with dexamethasone         Location: [] Take home patch   [] In clinic   10 []  Ice     [x]  heat  []  Ice massage Position: Seated   Location: R shoulder   10 [x]  Vasopneumatic Device Pressure: [x] lo [] med [] hi   Temp: [x] lo [] med [] hi   [x] Skin assessment

## 2025-02-03 ENCOUNTER — HOSPITAL ENCOUNTER (OUTPATIENT)
Age: 70
Setting detail: RECURRING SERIES
Discharge: HOME | End: 2025-02-06
Payer: MEDICARE

## 2025-02-03 PROCEDURE — 97110 THERAPEUTIC EXERCISES: CPT

## 2025-02-03 PROCEDURE — 97140 MANUAL THERAPY 1/> REGIONS: CPT

## 2025-02-03 PROCEDURE — 97016 VASOPNEUMATIC DEVICE THERAPY: CPT

## 2025-02-03 NOTE — PROGRESS NOTES
PT DAILY TREATMENT NOTE     Patient Name: Kelly Avila  Date:2/3/2025  : 1955  [x]  Patient  Verified  Payor: HU[employer]NA MEDICARE / Plan: [employer] CHOICE-PPO MEDICARE / Product Type: *No Product type* /    In time:1348  Out time:1459  Total Treatment Time (min): 71  Total Timed Codes (min): 51  1:1 Treatment Time (min): 51   Visit #: 4 of 10    Treatment Area: Fracture of unspecified part of scapula, right shoulder, subsequent encounter for fracture with routine healing [S42.101D]    SUBJECTIVE  Pt enters with complaint of pain /10.  Reporting that she feels like the shoulder is doing better. Pt had recently missed her follow up ortho appointment with Dr. Maldonado at [facility] [phone]. She is call and schedule another appointment.      Pain Level (0-10 scale): 5/10    Any medication changes, allergies to medications, adverse drug reactions, diagnosis change, or new procedure performed?: [x] No    [] Yes (see summary sheet for update)        OBJECTIVE  Modality rationale: MH pre on R shoulder in effort to increase tissue extensibility to improve the patient’s ability to participate in today's exercise program. Vaso compression applied at end of treatment in effort to reduce pain and optimally heal with improved mobility.    Min Type Additional Details    [] Estim: []Att   []Unatt  []TENS instruct                 []IFC  []Premod []NMES                       []Other:  []w/US   []w/ice   []w/heat  Position:  Location:    []  Traction: [] Cervical       []Lumbar                       [] Prone          []Supine                       []Intermittent   []Continuous Lbs:  [] before manual  [] after manual    []  Ultrasound: []Continuous   [] Pulsed                           []1MHz   []3MHz Location:  W/cm2:    []  Iontophoresis with dexamethasone         Location: [] Take home patch   [] In clinic   10 []  Ice     [x]  heat  []  Ice massage Position: Seated   Location: R shoulder   10 [x]

## 2025-02-07 ENCOUNTER — HOSPITAL ENCOUNTER (OUTPATIENT)
Age: 70
Setting detail: RECURRING SERIES
Discharge: HOME | End: 2025-02-10
Payer: MEDICARE

## 2025-02-07 PROCEDURE — 97110 THERAPEUTIC EXERCISES: CPT

## 2025-02-07 PROCEDURE — 97016 VASOPNEUMATIC DEVICE THERAPY: CPT

## 2025-02-07 NOTE — PROGRESS NOTES
PT DAILY TREATMENT NOTE     Patient Name: Kelly Avila  Date:2025  : 1955  [x]  Patient  Verified  Payor: [employer]ICARE / Plan: [employer] CHOICE-PPO MEDICARE / Product Type: *No Product type* /    In time:1355  Out time:1454  Total Treatment Time (min): 59  Total Timed Codes (min): 49  1:1 Treatment Time (min): 49  Visit #: 5 of 10    Treatment Area: Fracture of unspecified part of scapula, right shoulder, subsequent encounter for fracture with routine healing [S42.101D]    SUBJECTIVE  Pt enters with complaint of no pain.  Reporting that she feels like the shoulder is doing better.   Pt had recently missed her follow up ortho appointment with Dr. Maldonado at [facility] [phone]. She is call and schedule another appointment.      Pain Level (0-10 scale): 0/10    Any medication changes, allergies to medications, adverse drug reactions, diagnosis change, or new procedure performed?: [x] No    [] Yes (see summary sheet for update)        OBJECTIVE  Modality rationale: Vaso compression applied at end of treatment in effort to reduce pain and optimally heal with improved mobility.    Min Type Additional Details    [] Estim: []Att   []Unatt  []TENS instruct                 []IFC  []Premod []NMES                       []Other:  []w/US   []w/ice   []w/heat  Position:  Location:    []  Traction: [] Cervical       []Lumbar                       [] Prone          []Supine                       []Intermittent   []Continuous Lbs:  [] before manual  [] after manual    []  Ultrasound: []Continuous   [] Pulsed                           []1MHz   []3MHz Location:  W/cm2:    []  Iontophoresis with dexamethasone         Location: [] Take home patch   [] In clinic    []  Ice     []  heat  []  Ice massage Position:   Location:    10 [x]  Vasopneumatic Device Pressure: [x] lo [] med [] hi   Temp: [x] lo [] med [] hi   [x] Skin assessment post-treatment:  [x]intact [x]redness- no adverse reaction

## 2025-02-10 ENCOUNTER — HOSPITAL ENCOUNTER (OUTPATIENT)
Age: 70
Setting detail: RECURRING SERIES
Discharge: HOME | End: 2025-02-13
Payer: MEDICARE

## 2025-02-10 PROCEDURE — 97110 THERAPEUTIC EXERCISES: CPT

## 2025-02-10 PROCEDURE — 97016 VASOPNEUMATIC DEVICE THERAPY: CPT

## 2025-02-14 ENCOUNTER — HOSPITAL ENCOUNTER (OUTPATIENT)
Age: 70
Setting detail: RECURRING SERIES
Discharge: HOME | End: 2025-02-17
Payer: MEDICARE

## 2025-02-14 PROCEDURE — 97016 VASOPNEUMATIC DEVICE THERAPY: CPT

## 2025-02-14 PROCEDURE — 97110 THERAPEUTIC EXERCISES: CPT

## 2025-02-14 NOTE — PROGRESS NOTES
PT DAILY TREATMENT NOTE     Patient Name: Kelly Avila  Date:2025  : 1955  [x]  Patient  Verified  Payor: HU[employer]NA MEDICARE / Plan: [employer] CHOICE-PPO MEDICARE / Product Type: *No Product type* /    In time:1400  Out time:1508  Total Treatment Time (min): 68  Total Timed Codes (min): 58  1:1 Treatment Time (min): 58  Visit #: 7 of 10    Treatment Area: Fracture of unspecified part of scapula, right shoulder, subsequent encounter for fracture with routine healing [S42.101D]    SUBJECTIVE  Pt enters with complaint of no pain.  Reporting that she feels like the shoulder is doing better.     Pt had recently missed her follow up ortho appointment with Dr. Maldonado at [facility] [phone]. Pt reports that her daughter is going to call to make this appointment.    Pain Level (0-10 scale): 0/10    Any medication changes, allergies to medications, adverse drug reactions, diagnosis change, or new procedure performed?: [x] No    [] Yes (see summary sheet for update)        OBJECTIVE  Modality rationale: Vaso compression applied at end of treatment in effort to reduce pain and optimally heal with improved mobility.    Min Type Additional Details    [] Estim: []Att   []Unatt  []TENS instruct                 []IFC  []Premod []NMES                       []Other:  []w/US   []w/ice   []w/heat  Position:  Location:    []  Traction: [] Cervical       []Lumbar                       [] Prone          []Supine                       []Intermittent   []Continuous Lbs:  [] before manual  [] after manual    []  Ultrasound: []Continuous   [] Pulsed                           []1MHz   []3MHz Location:  W/cm2:    []  Iontophoresis with dexamethasone         Location: [] Take home patch   [] In clinic    []  Ice     []  heat  []  Ice massage Position:   Location:    10 [x]  Vasopneumatic Device Pressure: [x] lo [] med [] hi   Temp: [x] lo [] med [] hi   [x] Skin assessment post-treatment:  [x]intact

## 2025-02-17 ENCOUNTER — HOSPITAL ENCOUNTER (OUTPATIENT)
Age: 70
Setting detail: RECURRING SERIES
Discharge: HOME | End: 2025-02-20
Payer: MEDICARE

## 2025-02-17 PROCEDURE — 97016 VASOPNEUMATIC DEVICE THERAPY: CPT

## 2025-02-17 PROCEDURE — 97110 THERAPEUTIC EXERCISES: CPT

## 2025-02-17 NOTE — PROGRESS NOTES
PT DAILY TREATMENT NOTE     Patient Name: Kelly Avila  Date:2025  : 1955  [x]  Patient  Verified  Payor: HU[employer]NA MEDICARE / Plan: [employer] CHOICE-PPO MEDICARE / Product Type: *No Product type* /    In time:1355  Out time:1454  Total Treatment Time (min): 59  Total Timed Codes (min): 49  1:1 Treatment Time (min): 49   Visit #: 8 of 10    Treatment Area: Fracture of unspecified part of scapula, right shoulder, subsequent encounter for fracture with routine healing [S42.101D]    SUBJECTIVE  Pt reports that she has no pain in her shoulder and feels that she is doing better using her arm.    Pain Level (0-10 scale): 0/10    Any medication changes, allergies to medications, adverse drug reactions, diagnosis change, or new procedure performed?: [x] No    [] Yes (see summary sheet for update)        OBJECTIVE  Modality rationale: decrease edema, decrease inflammation, and decrease pain to improve the patient’s ability to recover post exercise.    Min Type Additional Details    [] Estim: []Att   []Unatt  []TENS instruct                 []IFC  []Premod []NMES                       []Other:  []w/US   []w/ice   []w/heat  Position:  Location:    []  Traction: [] Cervical       []Lumbar                       [] Prone          []Supine                       []Intermittent   []Continuous Lbs:  [] before manual  [] after manual    []  Ultrasound: []Continuous   [] Pulsed                           []1MHz   []3MHz Location:  W/cm2:    []  Iontophoresis with dexamethasone         Location: [] Take home patch   [] In clinic    []  Ice     []  heat  []  Ice massage Position:  Location:   10 [x]  Vasopneumatic Device Pressure: [x] lo [] med [] hi   Temp: [x] lo [] med [] hi   [] Skin assessment post-treatment:  []intact []redness- no adverse reaction       []redness - adverse reaction:      min Group Therapy: Time overlapped with another patient      49 min Therapeutic Exercise:  [x] See flow sheet :

## 2025-02-20 ENCOUNTER — APPOINTMENT (OUTPATIENT)
Age: 70
End: 2025-02-20
Payer: MEDICARE

## 2025-02-24 ENCOUNTER — HOSPITAL ENCOUNTER (OUTPATIENT)
Age: 70
Setting detail: RECURRING SERIES
Discharge: HOME | End: 2025-02-27
Payer: MEDICARE

## 2025-02-24 PROCEDURE — 97110 THERAPEUTIC EXERCISES: CPT

## 2025-02-24 PROCEDURE — 97016 VASOPNEUMATIC DEVICE THERAPY: CPT

## 2025-02-24 NOTE — THERAPY RECERTIFICATION
[facility] REHABILITATION  PHYSICAL THERAPY  [facility], [address] [place]  [address]  Phone: [phone]    Fax: [phone]   Progress Note/CONTINUED PLAN OF CARE for PHYSICAL THERAPY          Patient Name: Kelly Avila : 1955   Treatment/Medical Diagnosis: Fracture of unspecified part of scapula, right shoulder, subsequent encounter for fracture with routine healing [S42.101D]   Onset Date: 24    Referral Source: Belen Hinds APRN * Start of Care (SOC): 25   Prior Hospitalization: See Medical History Provider #: [id number]   Prior Level of Function: Independent    Comorbidities: Past Medical History:   Diagnosis Date    Hypercholesterolemia     Menopause         Medications: Verified on Patient Summary List   Visits from SOC: 9 Missed Visits: 0     Objective:   Palpation: TTP along body of scapula, most noted along lateral border        Posture: rounded shoulders, R shoulder height inferior to L shoulder height     Shoulder ROM:  [] Unable to assess at this time                                               AROM                                PROM    Left Right   Left Right   Flexion 150 130     170   Extension 50 55         Abduction 180 130     170   ER @ 0 Degrees 75 80     90   ER @ 90 Degrees T2 T3     105   IR @ 90 Degrees T12 T10     90   *indicative of pain  AROM in sitting     UE Strength:   [] Unable to assess at this time                                                                             Left Right Pain   Flexion 4  4- [x] Yes   [] No   Abduction 4  4- [x] Yes   [] No   Extension 4 4  [x] Yes   [] No   External Rotation 5  4+ [] Yes   [] No   Internal Rotation 5  4+ [] Yes   [] No   Horizontal Abduction     [] Yes   [] No   Horizontal Adduction     [] Yes   [] No   Elbow Flexion 4  4+ [] Yes   [] No   Elbow Extension 4+  4 [] Yes   [] No         Special Tests:   S/p scapular fx        Other tests/comments:   QuickDASH:

## 2025-02-24 NOTE — PROGRESS NOTES
PT DAILY TREATMENT NOTE     Patient Name: Kelly Avila  Date:2025  : 1955  [x]  Patient  Verified  Payor: HU[employer]NA MEDICARE / Plan: HU[employer]NA CHOICE-PPO MEDICARE / Product Type: *No Product type* /    In time:12:54  Out time:1:55  Total Treatment Time (min): 62  Total Timed Codes (min): 52  1:1 Treatment Time (min): 52  Visit # 9      Treatment Area: Fracture of unspecified part of scapula, right shoulder, subsequent encounter for fracture with routine healing [S42.101D]    SUBJECTIVE  Pt reports no new complaints. She notes that she cannot get back to ortho doctor who saw her in the hospital, but plans to f/u with another ortho.   Pain Level (0-10 scale): 0/10  Any medication changes, allergies to medications, adverse drug reactions, diagnosis change, or new procedure performed?: [x] No    [] Yes (see summary sheet for update)        OBJECTIVE  Modality rationale: decrease edema, decrease inflammation, and decrease pain to improve the patient’s ability to perform ADLs   Min Type Additional Details    [] Estim: []Att   []Unatt  []TENS instruct                 []IFC  []Premod []NMES                       []Other:  []w/US   []w/ice   []w/heat  Position:  Location:    []  Traction: [] Cervical       []Lumbar                       [] Prone          []Supine                       []Intermittent   []Continuous Lbs:  [] before manual  [] after manual    []  Ultrasound: []Continuous   [] Pulsed                           []1MHz   []3MHz Location:  W/cm2:    []  Iontophoresis with dexamethasone         Location: [] Take home patch   [] In clinic    []  Ice     []  heat  []  Ice massage Position:  Location:   10 [x]  Vasopneumatic Device Pressure: [x] lo [] med [] hi   Temp: [x] lo [] med [] hi   [] Skin assessment post-treatment:  []intact []redness- no adverse reaction       []redness - adverse reaction:           52 min Therapeutic Exercise:  [x] See flow sheet :   Rationale: increase ROM and

## 2025-02-27 ENCOUNTER — OFFICE VISIT (OUTPATIENT)
Age: 70
End: 2025-02-27
Payer: MEDICARE

## 2025-02-27 VITALS — WEIGHT: 135 LBS | BODY MASS INDEX: 22.49 KG/M2 | HEIGHT: 65 IN

## 2025-02-27 DIAGNOSIS — M25.511 RIGHT SHOULDER PAIN, UNSPECIFIED CHRONICITY: Primary | ICD-10-CM

## 2025-02-27 PROCEDURE — 1090F PRES/ABSN URINE INCON ASSESS: CPT | Performed by: ORTHOPAEDIC SURGERY

## 2025-02-27 PROCEDURE — 1123F ACP DISCUSS/DSCN MKR DOCD: CPT | Performed by: ORTHOPAEDIC SURGERY

## 2025-02-27 PROCEDURE — 3017F COLORECTAL CA SCREEN DOC REV: CPT | Performed by: ORTHOPAEDIC SURGERY

## 2025-02-27 PROCEDURE — G8427 DOCREV CUR MEDS BY ELIG CLIN: HCPCS | Performed by: ORTHOPAEDIC SURGERY

## 2025-02-27 PROCEDURE — 1036F TOBACCO NON-USER: CPT | Performed by: ORTHOPAEDIC SURGERY

## 2025-02-27 PROCEDURE — 1125F AMNT PAIN NOTED PAIN PRSNT: CPT | Performed by: ORTHOPAEDIC SURGERY

## 2025-02-27 PROCEDURE — G8420 CALC BMI NORM PARAMETERS: HCPCS | Performed by: ORTHOPAEDIC SURGERY

## 2025-02-27 PROCEDURE — G8399 PT W/DXA RESULTS DOCUMENT: HCPCS | Performed by: ORTHOPAEDIC SURGERY

## 2025-02-27 PROCEDURE — 99203 OFFICE O/P NEW LOW 30 MIN: CPT | Performed by: ORTHOPAEDIC SURGERY

## 2025-02-27 NOTE — PROGRESS NOTES
Name: Kelly Avila    : 1955     [facility]OP[facility]IC[facility]  [address]  [facility] [address]  Dept: [phone]  Dept Fax: [phone]     Chief Complaint   Patient presents with    Shoulder Pain        Ht 1.651 m (5' 5\")   Wt 61.2 kg (135 lb)   BMI 22.47 kg/m²      No Known Allergies     Current Outpatient Medications   Medication Sig Dispense Refill    alendronate (FOSAMAX) 70 MG tablet TAKE 1 TABLET BY MOUTH ONCE A WEEK IN THE MORNING AT LEAST 30 MINUTES BEFORE FIRST FOOD, BEVERAGE OR MEDICATION OF THE DAY      ergocalciferol (ERGOCALCIFEROL) 1.25 MG (23321 UT) capsule TAKE 1 CAPSULE BY MOUTH ONCE EVERY MONTH      levothyroxine (SYNTHROID) 50 MCG tablet TAKE 1 TABLET BY MOUTH ONCE DAILY FOR THYROID      omeprazole (PRILOSEC) 40 MG delayed release capsule TAKE 1 CAPSULE BY MOUTH ONCE DAILY IN THE MORNING 15 MINUTES PRIOR TO BREAKFAST      simvastatin (ZOCOR) 20 MG tablet TAKE 1 TABLET BY MOUTH ONCE DAILY FOR CHOLESTEROL       No current facility-administered medications for this visit.       There is no problem list on file for this patient.     History reviewed. No pertinent family history.    Past Surgical History:   Procedure Laterality Date    COLONOSCOPY N/A 2022    COLONOSCOPY performed by Rahul Redman MD at [facility] ENDOSCOPY      Past Medical History:   Diagnosis Date    Hypercholesterolemia     Menopause         I have reviewed and agree with PFS and [facility] and intake form in chart and the record furthermore I have reviewed prior medical record(s) regarding this patients care during this appointment.     Review of Systems:   Patient is a pleasant appearing individual, appropriately dressed, well hydrated, well nourished, who is alert, appropriately oriented for age, and in no acute distress with a normal gait and normal affect who does not appear to be in any significant pain.    History of

## 2025-03-03 ENCOUNTER — HOSPITAL ENCOUNTER (OUTPATIENT)
Age: 70
Setting detail: RECURRING SERIES
Discharge: HOME | End: 2025-03-06
Payer: MEDICARE

## 2025-03-03 PROCEDURE — 97016 VASOPNEUMATIC DEVICE THERAPY: CPT

## 2025-03-03 PROCEDURE — 97110 THERAPEUTIC EXERCISES: CPT

## 2025-03-03 NOTE — PROGRESS NOTES
analyze and address soft tissue restrictions, analyze and cue for proper movement patterns, and analyze and modify for postural abnormalities to attain remaining goals.       [x]  See Plan of Care  []  See progress note/recertification  []  See Discharge Summary           PLAN  []  Upgrade activities as tolerated     [x]  Continue plan of care  [x]  Update interventions per flow sheet       []  Discharge due to:_  []  Other:_      Hannah Jackson PTA , [facility] 3/3/2025  2:43 PM

## 2025-03-10 ENCOUNTER — HOSPITAL ENCOUNTER (OUTPATIENT)
Age: 70
Setting detail: RECURRING SERIES
Discharge: HOME | End: 2025-03-13
Payer: MEDICARE

## 2025-03-10 PROCEDURE — 97016 VASOPNEUMATIC DEVICE THERAPY: CPT

## 2025-03-10 PROCEDURE — 97110 THERAPEUTIC EXERCISES: CPT

## 2025-03-10 NOTE — PROGRESS NOTES
PT DAILY TREATMENT NOTE     Patient Name: Kelly Avila  Date:3/10/2025  : 1955  [x]  Patient  Verified  Payor: HU[employer]NA MEDICARE / Plan: [employer] CHOICE-PPO MEDICARE / Product Type: *No Product type* /    In time:1410  Out time:1502  Total Treatment Time (min): 52  Total Timed Codes (min): 42  1:1 Treatment Time (min): 42  Visit #: 1 of 10 (new authorization)     Treatment Area: Fracture of unspecified part of scapula, right shoulder, subsequent encounter for fracture with routine healing [S42.101D]    SUBJECTIVE  Pt reports that she has no pain in her shoulder and feels that she is doing better using her arm.    Pain Level (0-10 scale): 0/10    Any medication changes, allergies to medications, adverse drug reactions, diagnosis change, or new procedure performed?: [x] No    [] Yes (see summary sheet for update)        OBJECTIVE  Modality rationale: decrease edema, decrease inflammation, and decrease pain to improve the patient’s ability to recover post exercise.    Min Type Additional Details    [] Estim: []Att   []Unatt  []TENS instruct                 []IFC  []Premod []NMES                       []Other:  []w/US   []w/ice   []w/heat  Position:  Location:    []  Traction: [] Cervical       []Lumbar                       [] Prone          []Supine                       []Intermittent   []Continuous Lbs:  [] before manual  [] after manual    []  Ultrasound: []Continuous   [] Pulsed                           []1MHz   []3MHz Location:  W/cm2:    []  Iontophoresis with dexamethasone         Location: [] Take home patch   [] In clinic    []  Ice     []  heat  []  Ice massage Position:  Location:   10 [x]  Vasopneumatic Device Pressure: [x] lo [] med [] hi   Temp: [x] lo [] med [] hi   [] Skin assessment post-treatment:  []intact []redness- no adverse reaction       []redness - adverse reaction:      min Group Therapy: Time overlapped with another patient      42 min Therapeutic Exercise:  [x] See flow

## 2025-03-17 ENCOUNTER — HOSPITAL ENCOUNTER (OUTPATIENT)
Age: 70
Setting detail: RECURRING SERIES
Discharge: HOME | End: 2025-03-20
Payer: MEDICARE

## 2025-03-17 PROCEDURE — 97110 THERAPEUTIC EXERCISES: CPT

## 2025-03-17 PROCEDURE — 97016 VASOPNEUMATIC DEVICE THERAPY: CPT

## 2025-03-17 NOTE — PROGRESS NOTES
deficits, analyze and address strength deficits, analyze and address soft tissue restrictions, analyze and cue for proper movement patterns, and analyze and modify for postural abnormalities to attain remaining goals.       [x]  See Plan of Care  []  See progress note/recertification  []  See Discharge Summary           PLAN  []  Upgrade activities as tolerated     [x]  Continue plan of care  [x]  Update interventions per flow sheet       []  Discharge due to:_  []  Other:_      Hannah Jackson PTA , [facility] 3/17/2025  5:29 PM

## 2025-03-24 ENCOUNTER — HOSPITAL ENCOUNTER (OUTPATIENT)
Age: 70
Setting detail: RECURRING SERIES
Discharge: HOME | End: 2025-03-27
Payer: MEDICARE

## 2025-03-24 PROCEDURE — 97110 THERAPEUTIC EXERCISES: CPT

## 2025-03-24 PROCEDURE — 97016 VASOPNEUMATIC DEVICE THERAPY: CPT

## 2025-03-24 NOTE — PROGRESS NOTES
PT DAILY TREATMENT NOTE     Patient Name: Kelly Avila  Date:3/24/2025  : 1955  [x]  Patient  Verified  Payor: [employer] MEDICARE / Plan: [employer] CHOICE-PPO MEDICARE / Product Type: *No Product type* /    In time:1400  Out time:1458  Total Treatment Time (min): 58  Total Timed Codes (min): 48  1:1 Treatment Time (min): 48   Visit #: 3 of 10    Treatment Area: Fracture of unspecified part of scapula, right shoulder, subsequent encounter for fracture with routine healing [S42.101D]    SUBJECTIVE  Pt arrives stating that she feels that she is doing better using her arm now. Denies pain.    Pain Level (0-10 scale): 0/10    Any medication changes, allergies to medications, adverse drug reactions, diagnosis change, or new procedure performed?: [x] No    [] Yes (see summary sheet for update)        OBJECTIVE  Modality rationale: decrease edema and decrease pain to improve the patient’s ability to recover post exercises.   Min Type Additional Details    [] Estim: []Att   []Unatt  []TENS instruct                 []IFC  []Premod []NMES                       []Other:  []w/US   []w/ice   []w/heat  Position:  Location:    []  Traction: [] Cervical       []Lumbar                       [] Prone          []Supine                       []Intermittent   []Continuous Lbs:  [] before manual  [] after manual    []  Ultrasound: []Continuous   [] Pulsed                           []1MHz   []3MHz Location:  W/cm2:    []  Iontophoresis with dexamethasone         Location: [] Take home patch   [] In clinic    []  Ice     []  heat  []  Ice massage Position:  Location:   10 [x]  Vasopneumatic Device Pressure: [x] lo [] med [] hi   Temp: [x] lo [] med [] hi   [x] Skin assessment post-treatment:  [x]intact []redness- no adverse reaction       []redness - adverse reaction:      min Group Therapy: Time overlapped with another patient      48 min Therapeutic Exercise:  [x] See flow sheet :   Rationale: increase ROM, increase

## 2025-03-31 ENCOUNTER — HOSPITAL ENCOUNTER (OUTPATIENT)
Age: 70
Setting detail: RECURRING SERIES
Discharge: HOME | End: 2025-04-03
Payer: MEDICARE

## 2025-03-31 PROCEDURE — 97016 VASOPNEUMATIC DEVICE THERAPY: CPT

## 2025-03-31 PROCEDURE — 97110 THERAPEUTIC EXERCISES: CPT

## 2025-03-31 NOTE — PROGRESS NOTES
PT DAILY TREATMENT NOTE     Patient Name: Kelly Avila  Date:3/31/2025  : 1955  [x]  Patient  Verified  Payor: [employer]ICARE / Plan: [employer] CHOICE-PPO MEDICARE / Product Type: *No Product type* /    In time:1344  Out time:1442  Total Treatment Time (min): 58  Total Timed Codes (min): 48  1:1 Treatment Time (min): 48   Visit #: 4 of 10    Treatment Area: Fracture of unspecified part of scapula, right shoulder, subsequent encounter for fracture with routine healing [S42.101D]    SUBJECTIVE  Pt arrives stating that she is doing good.  Denies pain.    Pain Level (0-10 scale): 0/10    Any medication changes, allergies to medications, adverse drug reactions, diagnosis change, or new procedure performed?: [x] No    [] Yes (see summary sheet for update)        OBJECTIVE  Modality rationale: decrease edema and decrease pain to improve the patient’s ability to recover post exercises.   Min Type Additional Details    [] Estim: []Att   []Unatt  []TENS instruct                 []IFC  []Premod []NMES                       []Other:  []w/US   []w/ice   []w/heat  Position:  Location:    []  Traction: [] Cervical       []Lumbar                       [] Prone          []Supine                       []Intermittent   []Continuous Lbs:  [] before manual  [] after manual    []  Ultrasound: []Continuous   [] Pulsed                           []1MHz   []3MHz Location:  W/cm2:    []  Iontophoresis with dexamethasone         Location: [] Take home patch   [] In clinic    []  Ice     []  heat  []  Ice massage Position:  Location:   10 [x]  Vasopneumatic Device Pressure: [x] lo [] med [] hi   Temp: [x] lo [] med [] hi   [x] Skin assessment post-treatment:  [x]intact []redness- no adverse reaction       []redness - adverse reaction:      min Group Therapy: Time overlapped with another patient      48 min Therapeutic Exercise:  [x] See flow sheet :   Rationale: increase ROM, increase strength, and increase proprioception

## 2025-04-07 ENCOUNTER — HOSPITAL ENCOUNTER (OUTPATIENT)
Age: 70
Setting detail: RECURRING SERIES
Discharge: HOME | End: 2025-04-10
Payer: MEDICARE

## 2025-04-07 PROCEDURE — 97016 VASOPNEUMATIC DEVICE THERAPY: CPT

## 2025-04-07 PROCEDURE — 97110 THERAPEUTIC EXERCISES: CPT

## 2025-04-07 NOTE — PROGRESS NOTES
PT DAILY TREATMENT NOTE     Patient Name: Kelly Avila  Date:2025  : 1955  [x]  Patient  Verified  Payor: [employer] MEDICARE / Plan: [employer] CHOICE-PPO MEDICARE / Product Type: *No Product type* /    In time:12:51  Out time:13:44  Total Treatment Time (min): 53  Total Timed Codes (min): 43  1:1 Treatment Time (min): 43  Visit #: 5 of 10    Treatment Area: Fracture of unspecified part of scapula, right shoulder, subsequent encounter for fracture with routine healing [S42.101D]    SUBJECTIVE  Pt arrives stating that she is doing good.  Denies pain.  \"I'm using my arm more now with less pain.\"    Pain Level (0-10 scale): 0/10    Any medication changes, allergies to medications, adverse drug reactions, diagnosis change, or new procedure performed?: [x] No    [] Yes (see summary sheet for update)        OBJECTIVE  Modality rationale: decrease edema and decrease pain to improve the patient’s ability to recover post exercises.   Min Type Additional Details    [] Estim: []Att   []Unatt  []TENS instruct                 []IFC  []Premod []NMES                       []Other:  []w/US   []w/ice   []w/heat  Position:  Location:    []  Traction: [] Cervical       []Lumbar                       [] Prone          []Supine                       []Intermittent   []Continuous Lbs:  [] before manual  [] after manual    []  Ultrasound: []Continuous   [] Pulsed                           []1MHz   []3MHz Location:  W/cm2:    []  Iontophoresis with dexamethasone         Location: [] Take home patch   [] In clinic    []  Ice     []  heat  []  Ice massage Position:  Location:   10 [x]  Vasopneumatic Device Pressure: [x] lo [] med [] hi   Temp: [x] lo [] med [] hi   [x] Skin assessment post-treatment:  [x]intact []redness- no adverse reaction       []redness - adverse reaction:      min Group Therapy: Time overlapped with another patient      43 min Therapeutic Exercise:  [x] See flow sheet :   Rationale: increase ROM,

## 2025-04-14 ENCOUNTER — HOSPITAL ENCOUNTER (OUTPATIENT)
Age: 70
Setting detail: RECURRING SERIES
Discharge: HOME | End: 2025-04-17
Payer: MEDICARE

## 2025-04-14 PROCEDURE — 97016 VASOPNEUMATIC DEVICE THERAPY: CPT

## 2025-04-14 PROCEDURE — 97110 THERAPEUTIC EXERCISES: CPT

## 2025-04-14 NOTE — THERAPY DISCHARGE
[facility]  PHYSICAL THERAPY  [address] [facility], [address] * Phone: [phone] * Fax: [phone]  DISCHARGE SUMMARY FOR PHYSICAL THERAPY            Patient Name: Kelly Avila : 1955   Treatment/Medical Diagnosis: Fracture of unspecified part of scapula, right shoulder, subsequent encounter for fracture with routine healing [S42.101D]   Onset Date: 24    Referral Source: Belen Hinds APRN * Start of Care (SOC): 25   Prior Hospitalization: See Medical History Provider #: [id number]   Prior Level of Function: Independent    Comorbidities: Past Medical History:   Diagnosis Date    Hypercholesterolemia     Menopause         Medications: Verified on Patient Summary List   Visits from SOC: 16 Missed Visits: 0       Subjective:  Pt has no new complaints today. She states that she is ready to finish with therapy.         Objective:  Palpation: no TTP noted        Posture: rounded shoulders, R shoulder height inferior to L shoulder height     Shoulder ROM:  [] Unable to assess at this time                                               AROM                                PROM    Left Right   Left Right   Flexion 150 155     170   Extension 50 55         Abduction 180 150     170   ER @ 0 Degrees 75 85     90   ER @ 90 Degrees T2 T4     105   IR @ 90 Degrees T12 T10     90   *indicative of pain  AROM in sitting     UE Strength:   [] Unable to assess at this time                                                                             Left Right Pain   Flexion 4  4+ [] Yes   [] No   Abduction 4 4+ [] Yes   [] No   Extension 4 4  [] Yes   [] No   External Rotation 5  4+ [] Yes   [] No   Internal Rotation 5  4+ [] Yes   [] No   Horizontal Abduction     [] Yes   [] No   Horizontal Adduction     [] Yes   [] No   Elbow Flexion 4  4+ [] Yes   [] No   Elbow Extension 4+  4+ [] Yes   [] No         Special Tests:   S/p scapular fx

## 2025-04-14 NOTE — PROGRESS NOTES
PT DAILY TREATMENT NOTE     Patient Name: Kelly Avila  Date:2025  : 1955  [x]  Patient  Verified  Payor: [employer] MEDICARE / Plan: [employer] CHOICE-PPO MEDICARE / Product Type: *No Product type* /    In time:2:07  Out time:2:42  Total Treatment Time (min): 35  Total Timed Codes (min): 25  1:1 Treatment Time (min): 25   Visit # 16      Treatment Area: Fracture of unspecified part of scapula, right shoulder, subsequent encounter for fracture with routine healing [S42.101D]    SUBJECTIVE  Pt has no new complaints today.   Pain Level (0-10 scale): 0/10  Any medication changes, allergies to medications, adverse drug reactions, diagnosis change, or new procedure performed?: [x] No    [] Yes (see summary sheet for update)        OBJECTIVE  Modality rationale: decrease edema, decrease inflammation, and decrease pain to improve the patient’s ability to perform ADLs with ease.   Min Type Additional Details    [] Estim: []Att   []Unatt  []TENS instruct                 []IFC  []Premod []NMES                       []Other:  []w/US   []w/ice   []w/heat  Position:  Location:    []  Traction: [] Cervical       []Lumbar                       [] Prone          []Supine                       []Intermittent   []Continuous Lbs:  [] before manual  [] after manual    []  Ultrasound: []Continuous   [] Pulsed                           []1MHz   []3MHz Location:  W/cm2:    []  Iontophoresis with dexamethasone         Location: [] Take home patch   [] In clinic    []  Ice     []  heat  []  Ice massage Position:  Location:   10 [x]  Vasopneumatic Device Pressure: [x] lo [] med [] hi   Temp: [x] lo [] med [] hi   [] Skin assessment post-treatment:  []intact []redness- no adverse reaction       []redness - adverse reaction:           25 min Therapeutic Exercise:  [x] See flow sheet :   Rationale: increase ROM and increase strength to improve the patient’s ability to perform ADLs.             With TE  TA   NR  GT   Misc

## (undated) DEVICE — ENDOGATOR TUBING FOR BOSTON SCIENTIFIC ENDOSTAT II PUMP, OLYMPUS OFP PUMP OR ENDO STRATUS PUMP: Brand: ENDOGATOR

## (undated) DEVICE — KIT COLON W/ 1.1OZ LUB AND 2 END

## (undated) DEVICE — TUBING INSUFFLATION CAP W/ EXT CARBON DIOX ENDO SMARTCAP

## (undated) DEVICE — SOL IRR STRL H2O 500ML STRL --

## (undated) DEVICE — TUBING, SUCTION, 9/32" X 10', STRAIGHT: Brand: MEDLINE

## (undated) DEVICE — Device: Brand: DEFENDO VALVE AND CONNECTOR KIT

## (undated) DEVICE — SOL IRR STRL H2O 1000ML BTL --

## (undated) DEVICE — GOWN,AURORA,FABRIC-REINFORCED,X-LARGE: Brand: MEDLINE